# Patient Record
Sex: FEMALE | Race: WHITE | NOT HISPANIC OR LATINO | Employment: FULL TIME | URBAN - METROPOLITAN AREA
[De-identification: names, ages, dates, MRNs, and addresses within clinical notes are randomized per-mention and may not be internally consistent; named-entity substitution may affect disease eponyms.]

---

## 2017-02-23 ENCOUNTER — OFFICE VISIT (OUTPATIENT)
Dept: PODIATRY | Facility: CLINIC | Age: 42
End: 2017-02-23
Payer: COMMERCIAL

## 2017-02-23 VITALS — BODY MASS INDEX: 23.63 KG/M2 | WEIGHT: 147 LBS | HEIGHT: 66 IN

## 2017-02-23 DIAGNOSIS — M72.2 PLANTAR FASCIITIS, RIGHT: Primary | ICD-10-CM

## 2017-02-23 PROCEDURE — 99203 OFFICE O/P NEW LOW 30 MIN: CPT | Performed by: PODIATRIST

## 2017-02-23 RX ORDER — MELOXICAM 7.5 MG/1
7.5 TABLET ORAL DAILY
Qty: 30 TABLET | Refills: 1 | Status: SHIPPED | OUTPATIENT
Start: 2017-02-23 | End: 2018-01-18

## 2017-02-23 NOTE — PATIENT INSTRUCTIONS
Initial musculoskeletal treatment recommendation:    1.  Stretch the calf muscles as instructed once an hour.  2.  Ice the injured area in the evening; 20 min on/off.  3.  Take antiinflammatory medication as directed.  4.  Massage the soft tissues around the injured area in the morning to loosen the tissue  5.  Wear supportive foot wear and/or arch supports (rigid not cushion) such as Superfeet inserts which can be found at [facility] or [facility].       If no improvement in symptoms within four to six weeks, return to clinic for reevaluation.

## 2017-02-23 NOTE — MR AVS SNAPSHOT
After Visit Summary   2/23/2017    Joann Maurer    MRN: [id number]           Patient Information     Date Of Birth          1975        Visit Information        Provider Department      2/23/2017 9:45 AM Jose A Arteaga DPM [facility]        Today's Diagnoses     Plantar fasciitis, right    -  1      Care Instructions    Initial musculoskeletal treatment recommendation:    1.  Stretch the calf muscles as instructed once an hour.  2.  Ice the injured area in the evening; 20 min on/off.  3.  Take antiinflammatory medication as directed.  4.  Massage the soft tissues around the injured area in the morning to loosen the tissue  5.  Wear supportive foot wear and/or arch supports (rigid not cushion) such as Superfeet inserts which can be found at [facility] or [facility].       If no improvement in symptoms within four to six weeks, return to clinic for reevaluation.          Follow-ups after your visit        Additional Services     ORTHOTICS REFERRAL       Please be aware that coverage of these services is subject to the terms and limitations of your health insurance plan.  Call member services at your health plan with any benefit or coverage questions.      Please bring the following to your appointment:    >>   Any x-rays, CTs or MRIs which have been performed.  Contact the facility where they were done to arrange for  prior to your scheduled appointment.  Any new CT, MRI or other procedures ordered by your specialist must be performed at a [facility] facility or coordinated by your clinic's referral office.    >>   List of current medications   >>   This referral request   >>   Any documents/labs given to you for this referral    ==This Referral PRINTS in the [facility] (ORTHOTICS & PROSTHETICS) Central scheduling office ==     The [facility] Central Scheduling staff will contact patient to arrange appointments. Central Scheduling  "Phone #:  REFUGIO Velásquez  [phone]     Orthotics: Foot Orthotics                  Follow-up notes from your care team     Return in about 4 weeks (around 3/23/2017), or if symptoms worsen or fail to improve.      Who to contact     If you have questions or need follow up information about today's clinic visit or your schedule please contact [facility] directly at [phone].  Normal or non-critical lab and imaging results will be communicated to you by [employer], letter or phone within 4 business days after the clinic has received the results. If you do not hear from us within 7 days, please contact the clinic through [employer] or phone. If you have a critical or abnormal lab result, we will notify you by phone as soon as possible.  Submit refill requests through [employer] or call your pharmacy and they will forward the refill request to us. Please allow 3 business days for your refill to be completed.          Additional Information About Your Visit        [employer] Information     [employer] gives you secure access to your electronic health record. If you see a primary care provider, you can also send messages to your care team and make appointments. If you have questions, please call your primary care clinic.  If you do not have a primary care provider, please call [phone] and they will assist you.        Care EveryWhere ID     This is your Care EveryWhere ID. This could be used by other organizations to access your [facility] medical records  [id number]        Your Vitals Were     Height BMI (Body Mass Index)                1.664 m (5' 5.5\") 24.09 kg/m2           Blood Pressure from Last 3 Encounters:   06/25/15 (!) 137/99   06/04/15 116/83    Weight from Last 3 Encounters:   02/23/17 66.7 kg (147 lb)   06/04/15 64.2 kg (141 lb 9.6 oz)              We Performed the Following     ORTHOTICS REFERRAL          Today's Medication Changes          These changes are accurate as of: " 2/23/17 10:17 AM.  If you have any questions, ask your nurse or doctor.               Start taking these medicines.        Dose/Directions    meloxicam 7.5 MG tablet   Commonly known as:  MOBIC   Used for:  Plantar fasciitis, right   Started by:  Jose A Arteaga DPM        Dose:  7.5 mg   Take 1 tablet (7.5 mg) by mouth daily   Quantity:  30 tablet   Refills:  1            Where to get your medicines      These medications were sent to [id number] [address]  [address], [facility] [address]     Phone:  [phone]     meloxicam 7.5 MG tablet                Primary Care Provider Office Phone # Fax #    Azul Sargent [phone]       [facility] [address] [address]        Thank you!     Thank you for choosing [facility]  for your care. Our goal is always to provide you with excellent care. Hearing back from our patients is one way we can continue to improve our services. Please take a few minutes to complete the written survey that you may receive in the mail after your visit with us. Thank you!             Your Updated Medication List - Protect others around you: Learn how to safely use, store and throw away your medicines at www.disposemymeds.org.          This list is accurate as of: 2/23/17 10:17 AM.  Always use your most recent med list.                   Brand Name Dispense Instructions for use    meloxicam 7.5 MG tablet    MOBIC    30 tablet    Take 1 tablet (7.5 mg) by mouth daily

## 2017-02-23 NOTE — PROGRESS NOTES
"PATIENT HISTORY:  Joann Maurer is a 42 year old female who presents to clinic for a painful right foot .  The patient describes the pain as sharp stabbing.  The patient relates pain is located on the bottom of the right heel.  The patient relates the pain has been present for the past several weeks.  The patient relates pain with ambulation.  The patient has tried different shoes with little relief.      REVIEW OF SYSTEMS:  Constitutional, HEENT, cardiovascular, pulmonary, GI, , musculoskeletal, neuro, skin, endocrine and psych systems are negative, except as otherwise noted.     PAST MEDICAL HISTORY:   Past Medical History   Diagnosis Date     Exercise-induced asthma         PAST SURGICAL HISTORY:   Past Surgical History   Procedure Laterality Date     Hc ablation, endometrial, thermal, w/o hysteroscopic guidance          MEDICATIONS:   Current Outpatient Prescriptions:      meloxicam (MOBIC) 7.5 MG tablet, Take 1 tablet (7.5 mg) by mouth daily, Disp: 30 tablet, Rfl: 1     ALLERGIES:    Allergies   Allergen Reactions     Penicillins      Sulfa Drugs         SOCIAL HISTORY:   Social History     Social History     Marital status:      Spouse name: N/A     Number of children: N/A     Years of education: N/A     Occupational History     Not on file.     Social History Main Topics     Smoking status: Never Smoker     Smokeless tobacco: Never Used     Alcohol use Yes      Comment: socially     Drug use: No     Sexual activity: Yes     Partners: Male     Birth control/ protection: Surgical      Comment: vasectomy     Other Topics Concern     Not on file     Social History Narrative        FAMILY HISTORY:   Family History   Problem Relation Age of Onset     Hypertension Mother      Alcohol/Drug Father      alcohol     Alcohol/Drug Maternal Grandfather      alcohol     Breast Cancer Paternal Grandmother      late 50's     Alcohol/Drug Paternal Grandfather      alcohol        EXAM:Vitals: Ht 1.664 m (5' 5.5\")  Wt " 66.7 kg (147 lb)  BMI 24.09 kg/m2  BMI= Body mass index is 24.09 kg/(m^2).        General appearance: Patient is alert and fully cooperative with history & exam.  No sign of distress is noted during the visit.     Psychiatric: Affect is pleasant & appropriate.  Patient appears motivated to improve health.     Respiratory: Breathing is regular & unlabored while sitting.     HEENT: Hearing is intact to spoken word.  Speech is clear.  No gross evidence of visual impairment that would impact ambulation.     Dermatologic: Skin is intact to both lower extremities without significant lesions, rash or abrasion.  No paronychia or evidence of soft tissue infection is noted.     Vascular: DP & PT pulses are intact & regular bilaterally.  No significant edema or varicosities noted.  CFT and skin temperature is normal to both lower extremities.     Neurologic: Lower extremity sensation is intact to light touch.  No evidence of weakness or contracture in the lower extremities.  No evidence of neuropathy.     Musculoskeletal: Patient is ambulatory without assistive device or brace.  No gross ankle deformity noted.  No foot or ankle joint effusion is noted.  Noted pain on palpation at the attachment point of the plantar fascia on the right heel.  No surrounding erythema noted.       ASSESSMENT / PLAN:     ICD-10-CM    1. Plantar fasciitis, right M72.2 ORTHOTICS REFERRAL     meloxicam (MOBIC) 7.5 MG tablet       I have explained to Joann  about the conditions.  We discussed the nature of the condition as well as the treatment plan and expected length of recovery.  At this time, the patient was instructed on icing, stretching, tissue massage and support.  The patient was referred to [facility] for custom orthotics that will aid in offloading the tension forces to the soft tissues and prevent further inflammation.  To reduce the amount of current inflammation, the patient was prescribed Mobic 7.5 mg to be taken  daily with food and instructed to stop taking if any stomach irritation or swelling in extremities are noted.  The patient will return in four weeks for reevaluation if the symptoms do not resolve.          Disclaimer: This note consists of symbols derived from keyboarding, dictation and/or voice recognition software. As a result, there may be errors in the script that have gone undetected. Please consider this when interpreting information found in this chart.       ARY Arteaga D.P.M., F.ANUSHA.C.F.A.S.

## 2018-01-18 ENCOUNTER — OFFICE VISIT (OUTPATIENT)
Dept: OBGYN | Facility: CLINIC | Age: 43
End: 2018-01-18
Payer: COMMERCIAL

## 2018-01-18 ENCOUNTER — HOSPITAL ENCOUNTER (OUTPATIENT)
Dept: MAMMOGRAPHY | Facility: CLINIC | Age: 43
Discharge: HOME | End: 2018-01-18
Attending: OBSTETRICS & GYNECOLOGY | Admitting: OBSTETRICS & GYNECOLOGY
Payer: COMMERCIAL

## 2018-01-18 VITALS
HEIGHT: 66 IN | BODY MASS INDEX: 23.59 KG/M2 | TEMPERATURE: 98.3 F | RESPIRATION RATE: 16 BRPM | SYSTOLIC BLOOD PRESSURE: 140 MMHG | HEART RATE: 64 BPM | DIASTOLIC BLOOD PRESSURE: 99 MMHG | WEIGHT: 146.8 LBS

## 2018-01-18 DIAGNOSIS — Z12.31 VISIT FOR SCREENING MAMMOGRAM: ICD-10-CM

## 2018-01-18 DIAGNOSIS — F41.1 GENERALIZED ANXIETY DISORDER: ICD-10-CM

## 2018-01-18 DIAGNOSIS — Z01.419 ENCOUNTER FOR GYNECOLOGICAL EXAMINATION WITHOUT ABNORMAL FINDING: Primary | ICD-10-CM

## 2018-01-18 DIAGNOSIS — Z23 NEED FOR PROPHYLACTIC VACCINATION AND INOCULATION AGAINST INFLUENZA: ICD-10-CM

## 2018-01-18 PROCEDURE — 99213 OFFICE O/P EST LOW 20 MIN: CPT | Mod: 25 | Performed by: OBSTETRICS & GYNECOLOGY

## 2018-01-18 PROCEDURE — G0145 SCR C/V CYTO,THINLAYER,RESCR: HCPCS | Performed by: OBSTETRICS & GYNECOLOGY

## 2018-01-18 PROCEDURE — 77067 SCR MAMMO BI INCL CAD: CPT

## 2018-01-18 PROCEDURE — 99396 PREV VISIT EST AGE 40-64: CPT | Mod: 25 | Performed by: OBSTETRICS & GYNECOLOGY

## 2018-01-18 PROCEDURE — 87624 HPV HI-RISK TYP POOLED RSLT: CPT | Performed by: OBSTETRICS & GYNECOLOGY

## 2018-01-18 PROCEDURE — 90686 IIV4 VACC NO PRSV 0.5 ML IM: CPT | Performed by: OBSTETRICS & GYNECOLOGY

## 2018-01-18 PROCEDURE — 90471 IMMUNIZATION ADMIN: CPT | Performed by: OBSTETRICS & GYNECOLOGY

## 2018-01-18 RX ORDER — PAROXETINE 10 MG/1
10 TABLET, FILM COATED ORAL AT BEDTIME
Qty: 30 TABLET | Refills: 1 | Status: SHIPPED | OUTPATIENT
Start: 2018-01-18 | End: 2018-04-19 | Stop reason: DRUGHIGH

## 2018-01-18 ASSESSMENT — PATIENT HEALTH QUESTIONNAIRE - PHQ9
SUM OF ALL RESPONSES TO PHQ QUESTIONS 1-9: 2
5. POOR APPETITE OR OVEREATING: NEARLY EVERY DAY

## 2018-01-18 ASSESSMENT — ANXIETY QUESTIONNAIRES
6. BECOMING EASILY ANNOYED OR IRRITABLE: MORE THAN HALF THE DAYS
GAD7 TOTAL SCORE: 13
5. BEING SO RESTLESS THAT IT IS HARD TO SIT STILL: MORE THAN HALF THE DAYS
7. FEELING AFRAID AS IF SOMETHING AWFUL MIGHT HAPPEN: NOT AT ALL
1. FEELING NERVOUS, ANXIOUS, OR ON EDGE: MORE THAN HALF THE DAYS
3. WORRYING TOO MUCH ABOUT DIFFERENT THINGS: MORE THAN HALF THE DAYS
2. NOT BEING ABLE TO STOP OR CONTROL WORRYING: MORE THAN HALF THE DAYS

## 2018-01-18 NOTE — NURSING NOTE
"Chief Complaint   Patient presents with     Physical     discuss possibly getting something for anxiety       Initial BP (!) 138/105 (BP Location: Left arm, Patient Position: Chair, Cuff Size: Adult Regular)  Pulse 69  Temp 98.3  F (36.8  C) (Tympanic)  Resp 16  Ht 5' 5.5\" (1.664 m)  Wt 146 lb 12.8 oz (66.6 kg)  LMP  (LMP Unknown)  Breastfeeding? No  BMI 24.06 kg/m2 Estimated body mass index is 24.06 kg/(m^2) as calculated from the following:    Height as of this encounter: 5' 5.5\" (1.664 m).    Weight as of this encounter: 146 lb 12.8 oz (66.6 kg).  Medication Reconciliation: complete   Sera Gregg CMA      "

## 2018-01-18 NOTE — MR AVS SNAPSHOT
After Visit Summary   1/18/2018    Joann Maurer    MRN: [id number]           Patient Information     Date Of Birth          1975        Visit Information        Provider Department      1/18/2018 10:30 AM Azul Sargent MD [facility]        Today's Diagnoses     Encounter for gynecological examination without abnormal finding    -  1    Generalized anxiety disorder        Need for prophylactic vaccination and inoculation against influenza          Care Instructions      Preventive Health Recommendations  Female Ages 40 to 49    Yearly exam:     See your health care provider every year in order to  1. Review health changes.   2. Discuss preventive care.    3. Review your medicines if your doctor prescribed any.      Get a Pap test every three years (unless you have an abnormal result and your provider advises testing more often).      If you get Pap tests with HPV test, you only need to test every 5 years, unless you have an abnormal result. You do not need a Pap test if your uterus was removed (hysterectomy) and you have not had cancer.      You should be tested each year for STDs (sexually transmitted diseases), if you're at risk.       Ask your doctor if you should have a mammogram.      Have a colonoscopy (test for colon cancer) if someone in your family has had colon cancer or polyps before age 50.       Have a cholesterol test every 5 years.       Have a diabetes test (fasting glucose) after age 45. If you are at risk for diabetes, you should have this test every 3 years.    Shots: Get a flu shot each year. Get a tetanus shot every 10 years.     Nutrition:     Eat at least 5 servings of fruits and vegetables each day.    Eat whole-grain bread, whole-wheat pasta and brown rice instead of white grains and rice.    Talk to your provider about Calcium and Vitamin D.     Lifestyle    Exercise at least 150 minutes a week (an average of 30 minutes a day, 5 days a week). This will  help you control your weight and prevent disease.    Limit alcohol to one drink per day.    No smoking.     Wear sunscreen to prevent skin cancer.    See your dentist every six months for an exam and cleaning.          Follow-ups after your visit        Your next 10 appointments already scheduled     Jan 24, 2018  9:00 AM CST   School Visit with Fl School Provider   [place] [facility] [place]  ([facility] )    [address] [place] [id number] [phone]              Future tests that were ordered for you today     Open Future Orders        Priority Expected Expires Ordered    **Comprehensive metabolic panel FUTURE 1yr Routine 12/19/2018 1/18/2019 1/18/2018    **TSH with free T4 reflex FUTURE 1yr Routine 12/19/2018 1/18/2019 1/18/2018    **CBC with platelets FUTURE 1yr Routine 12/19/2018 1/18/2019 1/18/2018    Lipid panel reflex to direct LDL Fasting Routine 12/19/2018 1/18/2019 1/18/2018            Who to contact     If you have questions or need follow up information about today's clinic visit or your schedule please contact [facility] directly at [phone].  Normal or non-critical lab and imaging results will be communicated to you by [employer], letter or phone within 4 business days after the clinic has received the results. If you do not hear from us within 7 days, please contact the clinic through [employer] or phone. If you have a critical or abnormal lab result, we will notify you by phone as soon as possible.  Submit refill requests through [employer] or call your pharmacy and they will forward the refill request to us. Please allow 3 business days for your refill to be completed.          Additional Information About Your Visit        [employer] Information     [employer] gives you secure access to your electronic health record. If you see a primary care provider, you can also send messages to your care team and make appointments. If you  "have questions, please call your primary care clinic.  If you do not have a primary care provider, please call [phone] and they will assist you.        Care EveryWhere ID     This is your Care EveryWhere ID. This could be used by other organizations to access your [facility] medical records  [id number]        Your Vitals Were     Pulse Temperature Respirations Height Last Period Breastfeeding?    64 98.3  F (36.8  C) (Tympanic) 16 5' 5.5\" (1.664 m) (LMP Unknown) No    BMI (Body Mass Index)                   24.06 kg/m2            Blood Pressure from Last 3 Encounters:   01/18/18 (!) 140/99   06/25/15 (!) 137/99   06/04/15 116/83    Weight from Last 3 Encounters:   01/18/18 146 lb 12.8 oz (66.6 kg)   02/23/17 147 lb (66.7 kg)   06/04/15 141 lb 9.6 oz (64.2 kg)              We Performed the Following     FLU VAC, SPLIT VIRUS IM > 3 YO (QUADRIVALENT) [96429]     HPV High Risk Types DNA Cervical     Pap imaged thin layer screen with HPV - recommended age 30 - 65 years (select HPV order below)     Vaccine Administration, Initial [77285]          Today's Medication Changes          These changes are accurate as of: 1/18/18 12:52 PM.  If you have any questions, ask your nurse or doctor.               Start taking these medicines.        Dose/Directions    PARoxetine 10 MG tablet   Commonly known as:  PAXIL   Used for:  Generalized anxiety disorder   Started by:  Azul Sargent MD        Dose:  10 mg   Take 1 tablet (10 mg) by mouth At Bedtime   Quantity:  30 tablet   Refills:  1            Where to get your medicines      These medications were sent to [facility] - [place], [place] - [address]  [address] [place] [address] [address], [facility] [address]     Phone:  [phone]     PARoxetine 10 MG tablet                Primary Care Provider Office Phone # Fax #    Azul Sargent [phone] [address] [facility] [address]        Equal Access to Services     MAXINE BOWER " AH: Sergio Jones, wataishada luqadaha, qaybta kamariella guerlineraminrobbiedenise kasey haymeggan hernandezjennylydia chris. So [facility] [phone].    ATENCIÓN: Si habla español, tiene a phillips disposición servicios gratuitos de asistencia lingüística. Llame al [phone].    We comply with applicable federal civil rights laws and [place] laws. We do not discriminate on the basis of race, color, national origin, age, disability, sex, sexual orientation, or gender identity.            Thank you!     Thank you for choosing [facility]  for your care. Our goal is always to provide you with excellent care. Hearing back from our patients is one way we can continue to improve our services. Please take a few minutes to complete the written survey that you may receive in the mail after your visit with us. Thank you!             Your Updated Medication List - Protect others around you: Learn how to safely use, store and throw away your medicines at www.disposemymeds.org.          This list is accurate as of: 1/18/18 12:52 PM.  Always use your most recent med list.                   Brand Name Dispense Instructions for use Diagnosis    PARoxetine 10 MG tablet    PAXIL    30 tablet    Take 1 tablet (10 mg) by mouth At Bedtime    Generalized anxiety disorder

## 2018-01-18 NOTE — PROGRESS NOTES
SUBJECTIVE:   CC: Joann Maurer is an 43 year old woman who presents for preventive health visit.     Healthy Habits:    Do you get at least three servings of calcium containing foods daily (dairy, green leafy vegetables, etc.)? yes    Amount of exercise or daily activities, outside of work: 5-6 day(s) per week    Problems taking medications regularly No    Medication side effects: No    Have you had an eye exam in the past two years? no    Do you see a dentist twice per year? yes    Do you have sleep apnea, excessive snoring or daytime drowsiness?no        -------------------------------------    Today's PHQ-2 Score: PHQ-2 ( 1999 Pfizer) 1/18/2018 6/4/2015   Q1: Little interest or pleasure in doing things 0 0   Q2: Feeling down, depressed or hopeless 1 0   PHQ-2 Score 1 0         Abuse: Current or Past(Physical, Sexual or Emotional)- No  Do you feel safe in your environment - Yes  Social History   Substance Use Topics     Smoking status: Never Smoker     Smokeless tobacco: Never Used     Alcohol use Yes      Comment: socially     If you drink alcohol do you typically have >3 drinks per day or >7 drinks per week? No                     Reviewed orders with patient.  Reviewed health maintenance and updated orders accordingly - Yes  BP Readings from Last 3 Encounters:   01/18/18 (!) 140/99   06/25/15 (!) 137/99   06/04/15 116/83    Wt Readings from Last 3 Encounters:   01/18/18 146 lb 12.8 oz (66.6 kg)   02/23/17 147 lb (66.7 kg)   06/04/15 141 lb 9.6 oz (64.2 kg)                      Patient under age 50, mutual decision reflected in health maintenance.        Pertinent mammograms are reviewed under the imaging tab.  History of abnormal Pap smear:   NO - age 30-65 PAP every 5 years with negative HPV co-testing recommended  Last 3 Pap and HPV Results:   PAP / HPV Latest Ref Rng & Units 6/4/2015   PAP - NIL   HPV 16 DNA NEG Negative   HPV 18 DNA NEG Negative   OTHER HR HPV NEG Negative       Reviewed and updated as  "needed this visit by clinical staffTobacco  Allergies  Meds  Med Hx  Surg Hx  Fam Hx  Soc Hx        Reviewed and updated as needed this visit by Provider    ROS:  C: NEGATIVE for fever, chills, change in weight  I: NEGATIVE for worrisome rashes, moles or lesions  E: NEGATIVE for vision changes or irritation  ENT: NEGATIVE for ear, mouth and throat problems  R: NEGATIVE for significant cough or SOB  B: NEGATIVE for masses, tenderness or discharge  CV: NEGATIVE for chest pain, palpitations or peripheral edema  GI: NEGATIVE for nausea, abdominal pain, heartburn, or change in bowel habits  : NEGATIVE for unusual urinary or vaginal symptoms. Periods are regular.  M: NEGATIVE for significant arthralgias or myalgia  N: NEGATIVE for weakness, dizziness or paresthesias  P: NEGATIVE for changes in mood or affect    OBJECTIVE:   BP (!) 138/105 (BP Location: Left arm, Patient Position: Chair, Cuff Size: Adult Regular)  Pulse 69  Temp 98.3  F (36.8  C) (Tympanic)  Resp 16  Ht 5' 5.5\" (1.664 m)  Wt 146 lb 12.8 oz (66.6 kg)  LMP  (LMP Unknown)  Breastfeeding? No  BMI 24.06 kg/m2  EXAM:  GENERAL: healthy, alert and no distress  EYES: Eyes grossly normal to inspection  NECK: no adenopathy, no asymmetry, masses, or scars and thyroid normal to palpation  RESP: lungs clear to auscultation - no rales, rhonchi or wheezes  BREAST: normal without masses, tenderness or nipple discharge and no palpable axillary masses or adenopathy  CV: regular rate and rhythm, normal S1 S2, no S3 or S4, no murmur, click or rub, no peripheral edema and peripheral pulses strong  ABDOMEN: soft, nontender, no hepatosplenomegaly, no masses and bowel sounds normal   (female): normal female external genitalia, normal urethral meatus, vaginal mucosa pink, moist, well rugated, and normal cervix/adnexa/uterus without masses or discharge  MS: no gross musculoskeletal defects noted, no edema  SKIN: no suspicious lesions or rashes  NEURO: Normal " strength and tone, mentation intact and speech normal  PSYCH: mentation appears normal, affect normal/bright    ASSESSMENT/PLAN:   1. Encounter for gynecological examination without abnormal finding  Routine health maintenance reviewed.  Encouraged patient to check her blood pressures outside of the office (she has access at work).  If remains elevated, then we will need to consider initiating medication.    - **Comprehensive metabolic panel FUTURE 1yr; Future  - **TSH with free T4 reflex FUTURE 1yr; Future  - **CBC with platelets FUTURE 1yr; Future  - Lipid panel reflex to direct LDL Fasting; Future  - Pap imaged thin layer screen with HPV - recommended age 30 - 65 years (select HPV order below)  - HPV High Risk Types DNA Cervical    2. Generalized anxiety disorder  Discussed options for talk therapy versus trial of medication.  Patient amenable to trial of medication.  Reviewed medication pros/cons and when to consider increasing dosage.     - PARoxetine (PAXIL) 10 MG tablet; Take 1 tablet (10 mg) by mouth At Bedtime  Dispense: 30 tablet; Refill: 1    3. Need for prophylactic vaccination and inoculation against influenza  - FLU VAC, SPLIT VIRUS IM > 3 YO (QUADRIVALENT) [01644]  - Vaccine Administration, Initial [56516]    COUNSELING:   Reviewed preventive health counseling, as reflected in patient instructions  Special attention given to:        Regular exercise       Healthy diet/nutrition       Vision screening       Immunizations    Vaccinated for: Influenza           Alcohol Use       Folic Acid Counseling       Osteoporosis Prevention/Bone Health       Safe sex practices/STD prevention       (Ruma)menopause management            BP Screening:   Last 3 BP Readings:    BP Readings from Last 3 Encounters:   01/18/18 (!) 140/99   06/25/15 (!) 137/99   06/04/15 116/83       The following was recommended to the patient:  Re-screen within 4 weeks and recommend lifestyle modifications   reports that she has never  "smoked. She has never used smokeless tobacco.    Estimated body mass index is 24.06 kg/(m^2) as calculated from the following:    Height as of this encounter: 5' 5.5\" (1.664 m).    Weight as of this encounter: 146 lb 12.8 oz (66.6 kg).         Counseling Resources:  ATP IV Guidelines  Pooled Cohorts Equation Calculator  Breast Cancer Risk Calculator  FRAX Risk Assessment  ICSI Preventive Guidelines  Dietary Guidelines for Americans, 2010  USDA's MyPlate  ASA Prophylaxis  Lung CA Screening    Azul Sargent MD  [facility] Influenza Immunization Documentation    1.  Is the person to be vaccinated sick today?   No    2. Does the person to be vaccinated have an allergy to a component   of the vaccine?   No  Egg Allergy Algorithm Link    3. Has the person to be vaccinated ever had a serious reaction   to influenza vaccine in the past?   No    4. Has the person to be vaccinated ever had Guillain-Barré syndrome?   No    Form completed by Sera Gregg CMA           "

## 2018-01-19 ASSESSMENT — ANXIETY QUESTIONNAIRES: GAD7 TOTAL SCORE: 13

## 2018-01-23 LAB
COPATH REPORT: NORMAL
PAP: NORMAL

## 2018-01-24 ENCOUNTER — OFFICE VISIT (OUTPATIENT)
Dept: LAB | Facility: SCHOOL | Age: 43
End: 2018-01-24
Payer: COMMERCIAL

## 2018-01-24 VITALS
SYSTOLIC BLOOD PRESSURE: 132 MMHG | WEIGHT: 146 LBS | OXYGEN SATURATION: 96 % | HEIGHT: 66 IN | BODY MASS INDEX: 23.46 KG/M2 | DIASTOLIC BLOOD PRESSURE: 78 MMHG | HEART RATE: 94 BPM | TEMPERATURE: 98.4 F | RESPIRATION RATE: 20 BRPM

## 2018-01-24 DIAGNOSIS — Z00.00 ENCOUNTER FOR WELLNESS EXAMINATION IN ADULT: Primary | ICD-10-CM

## 2018-01-24 LAB — GLUCOSE SERPL-MCNC: 80 MG/DL (ref 70–99)

## 2018-01-24 PROCEDURE — 82947 ASSAY GLUCOSE BLOOD QUANT: CPT | Performed by: NURSE PRACTITIONER

## 2018-01-24 PROCEDURE — 36415 COLL VENOUS BLD VENIPUNCTURE: CPT | Performed by: NURSE PRACTITIONER

## 2018-01-24 PROCEDURE — 80061 LIPID PANEL: CPT | Performed by: NURSE PRACTITIONER

## 2018-01-24 PROCEDURE — 99203 OFFICE O/P NEW LOW 30 MIN: CPT | Performed by: NURSE PRACTITIONER

## 2018-01-24 NOTE — PROGRESS NOTES
"  SUBJECTIVE:  CC: Joann Maurer is an 43 year old woman who presents for Wellness Check at [facility] [id number].     Review of Healthy Lifestyle:    Do you get at least three servings of calcium containing foods daily (dairy, green leafy vegetables, etc.)? yes     Do you have a high-fiber diet? yes     Amount of exercise or daily activities, outside of work: 3-4 day(s) per week    Do you wear sunscreen on a regular basis? No     Are you taking your medications regularly Yes     Have you had an eye exam in the past two years? no    Do you see a dentist twice per year? yes    Do you have sleep apnea, excessive snoring or excessive daytime drowsiness? no    Do you use tobacco in any form? no       OBJECTIVE:    Vitals: /78 (BP Location: Right arm, Patient Position: Sitting, Cuff Size: Adult Regular)  Pulse 94  Temp 98.4  F (36.9  C) (Tympanic)  Resp 20  Ht 5' 5.5\" (1.664 m)  Wt 146 lb (66.2 kg)  LMP  (LMP Unknown)  SpO2 96%  Breastfeeding? No  BMI 23.93 kg/m2  BMI= Body mass index is 23.93 kg/(m^2).     HEARING:   Right Ear:      1000 Hz RESPONSE- on Level:   20 db  (Conditioning sound)   1000 Hz: RESPONSE- on Level:   20 db    2000 Hz: RESPONSE- on Level:   20 db    4000 Hz: RESPONSE- on Level:   20 db     Left Ear:      4000 Hz: RESPONSE- on Level:   20 db    2000 Hz: RESPONSE- on Level:   20 db    1000 Hz: RESPONSE- on Level:   20 db     500 Hz: RESPONSE- on Level:   20 db     Right Ear:    500 Hz: RESPONSE- on Level:   20 db     Hearing Acuity: Pass    Hearing Assessment: normal  VISION:  Right eye:  20/20  Left eye:     20/20  Both eyes: 20/20  Corrective lenses?  No    Medication Reconciliation: complete    ASSESSMENT/PLAN:    (Z00.00) Encounter for wellness examination in adult  (primary encounter diagnosis)  Comment:    Plan: Glucose whole blood, Lipid Profile           Discussed preventative counseling - which is UTD.        COUNSELING:      reports that she has never " "smoked. She has never used smokeless tobacco.    Estimated body mass index is 23.93 kg/(m^2) as calculated from the following:    Height as of this encounter: 5' 5.5\" (1.664 m).    Weight as of this encounter: 146 lb (66.2 kg).       Counseling Resources  [employer]'s MyPlate  [email or web address]/    [place] SCHO[employer] PROVIDER  [facility]     "

## 2018-01-24 NOTE — NURSING NOTE
"Initial /78 (BP Location: Right arm, Patient Position: Sitting, Cuff Size: Adult Regular)  Pulse 94  Temp 98.4  F (36.9  C) (Tympanic)  Resp 20  Ht 5' 5.5\" (1.664 m)  Wt 146 lb (66.2 kg)  LMP  (LMP Unknown)  SpO2 96%  Breastfeeding? No  BMI 23.93 kg/m2 Estimated body mass index is 23.93 kg/(m^2) as calculated from the following:    Height as of this encounter: 5' 5.5\" (1.664 m).    Weight as of this encounter: 146 lb (66.2 kg). .    Alysa Darnell CMA ([facility])  "

## 2018-01-24 NOTE — PATIENT INSTRUCTIONS
"Wellness Visit Recommendations:      See your regular primary care health provider every year in order to help stay healthy.    Review health changes.     Review your medicines if your doctor has prescribed any.    Talk to your provider about how often to have your cholesterol checked.    If you are at risk for diabetes, you should have a diabetes test (fasting glucose).    Shots: Get a flu shot each year. Get a tetanus shot every 10 years.     Review with your primary care provider other immunizations that you may need based on your age and/or medical/surgical history    Nutrition:     Eat at least 5 servings of fruits and vegetables each day.    Eat whole-grain bread, whole-wheat pasta and brown rice instead of white grains and rice.    Preventive Care to be reviewed by your primary care provider:    Females:        Cervical Cancer Screening                          Breast Cancer Screening                          Colon Cancer Screening  Males:             Prostrate Cancer Screening                          Colon Cancer Screening      Lifestyle:    Exercise at least 150 minutes a week (30 minutes a day, 5 days of the week). This will help you control your weight and help prevent disease or manage disease.    Limit alcohol to one drink per day or less depending on your past medical history.    No smoking.     Wear sunscreen to prevent skin cancer.    See your dentist every six months for an exam and cleaning.    Today's Vital Signs:  /78 (BP Location: Right arm, Patient Position: Sitting, Cuff Size: Adult Regular)  Pulse 94  Temp 98.4  F (36.9  C) (Tympanic)  Resp 20  Ht 5' 5.5\" (1.664 m)  Wt 146 lb (66.2 kg)  LMP  (LMP Unknown)  SpO2 96%  Breastfeeding? No  BMI 23.93 kg/m2                                                                                                                    Joann Maurer has completed a Wellness Check at the [address] on " 1/24/2018.      ____________________________________________  [facility]

## 2018-01-24 NOTE — MR AVS SNAPSHOT
After Visit Summary   1/24/2018    Joann Maurer    MRN: [id number]           Patient Information     Date Of Birth          1975        Visit Information        Provider Department      1/24/2018 9:00 AM Gianna Wells NP [facility]         Today's Diagnoses     Encounter for wellness examination in adult    -  1      Care Instructions    Wellness Visit Recommendations:      See your regular primary care health provider every year in order to help stay healthy.    Review health changes.     Review your medicines if your doctor has prescribed any.    Talk to your provider about how often to have your cholesterol checked.    If you are at risk for diabetes, you should have a diabetes test (fasting glucose).    Shots: Get a flu shot each year. Get a tetanus shot every 10 years.     Review with your primary care provider other immunizations that you may need based on your age and/or medical/surgical history    Nutrition:     Eat at least 5 servings of fruits and vegetables each day.    Eat whole-grain bread, whole-wheat pasta and brown rice instead of white grains and rice.    Preventive Care to be reviewed by your primary care provider:    Females:        Cervical Cancer Screening                          Breast Cancer Screening                          Colon Cancer Screening  Males:             Prostrate Cancer Screening                          Colon Cancer Screening      Lifestyle:    Exercise at least 150 minutes a week (30 minutes a day, 5 days of the week). This will help you control your weight and help prevent disease or manage disease.    Limit alcohol to one drink per day or less depending on your past medical history.    No smoking.     Wear sunscreen to prevent skin cancer.    See your dentist every six months for an exam and cleaning.    Today's Vital Signs:  /78 (BP Location: Right arm, Patient Position: Sitting, Cuff Size: Adult Regular)  Pulse  "94  Temp 98.4  F (36.9  C) (Tympanic)  Resp 20  Ht 5' 5.5\" (1.664 m)  Wt 146 lb (66.2 kg)  LMP  (LMP Unknown)  SpO2 96%  Breastfeeding? No  BMI 23.93 kg/m2                                                                                                                    Joann Maurer has completed a Wellness Check at the [place] [address] [facility] on 1/24/2018.      ____________________________________________  [employer]                                                                                         Follow-ups after your visit        Who to contact     If you have questions or need follow up information about today's clinic visit or your schedule please contact [place] [address] directly at [phone].  Normal or non-critical lab and imaging results will be communicated to you by MyChart, letter or phone within 4 business days after the clinic has received the results. If you do not hear from us within 7 days, please contact the clinic through Vysrhart or phone. If you have a critical or abnormal lab result, we will notify you by phone as soon as possible.  Submit refill requests through [employer] or call your pharmacy and they will forward the refill request to us. Please allow 3 business days for your refill to be completed.          Additional Information About Your Visit        MyChart Information     [employer] gives you secure access to your electronic health record. If you see a primary care provider, you can also send messages to your care team and make appointments. If you have questions, please call your primary care clinic.  If you do not have a primary care provider, please call [phone] and they will assist you.        Care EveryWhere ID     This is your Care EveryWhere ID. This could be used by other organizations to access your [place] medical records  [id number]        Your Vitals Were     Pulse Temperature Respirations Height Last Period Pulse " "Oximetry    94 98.4  F (36.9  C) (Tympanic) 20 5' 5.5\" (1.664 m) (LMP Unknown) 96%    Breastfeeding? BMI (Body Mass Index)                No 23.93 kg/m2           Blood Pressure from Last 3 Encounters:   01/24/18 132/78   01/18/18 (!) 140/99   06/25/15 (!) 137/99    Weight from Last 3 Encounters:   01/24/18 146 lb (66.2 kg)   01/18/18 146 lb 12.8 oz (66.6 kg)   02/23/17 147 lb (66.7 kg)              We Performed the Following     Glucose whole blood     Lipid Profile        Primary Care Provider Office Phone # Fax #    Azul Sargent [phone] [address] [facility] [address]        Equal Access to Services     MAXINE BOWER : Hadii aad ku hadasho Sojan, waaxda luqadaha, qaybta kaalmada adeceliyarukhsana, evonne grullon . So [facility] [phone].    ATENCIÓN: Si habla español, tiene a phillips disposición servicios gratuitos de asistencia lingüística. Jj al [phone].    We comply with applicable federal civil rights laws and [place] laws. We do not discriminate on the basis of race, color, national origin, age, disability, sex, sexual orientation, or gender identity.            Thank you!     Thank you for choosing [place] [address]  for your care. Our goal is always to provide you with excellent care. Hearing back from our patients is one way we can continue to improve our services. Please take a few minutes to complete the written survey that you may receive in the mail after your visit with us. Thank you!             Your Updated Medication List - Protect others around you: Learn how to safely use, store and throw away your medicines at www.disposemymeds.org.          This list is accurate as of 1/24/18  9:19 AM.  Always use your most recent med list.                   Brand Name Dispense Instructions for use Diagnosis    PARoxetine 10 MG tablet    PAXIL    30 tablet    Take 1 tablet (10 mg) by mouth At Bedtime    Generalized anxiety disorder "

## 2018-01-25 LAB
CHOLEST SERPL-MCNC: 223 MG/DL
FINAL DIAGNOSIS: NORMAL
HDLC SERPL-MCNC: 143 MG/DL
HPV HR 12 DNA CVX QL NAA+PROBE: NEGATIVE
HPV16 DNA SPEC QL NAA+PROBE: NEGATIVE
HPV18 DNA SPEC QL NAA+PROBE: NEGATIVE
LDLC SERPL CALC-MCNC: 70 MG/DL
NONHDLC SERPL-MCNC: 80 MG/DL
SPECIMEN DESCRIPTION: NORMAL
SPECIMEN SOURCE CVX/VAG CYTO: NORMAL
TRIGL SERPL-MCNC: 51 MG/DL

## 2018-04-19 DIAGNOSIS — F41.1 GENERALIZED ANXIETY DISORDER: ICD-10-CM

## 2018-04-19 RX ORDER — PAROXETINE 20 MG/1
20 TABLET, FILM COATED ORAL AT BEDTIME
Qty: 30 TABLET | Refills: 6 | Status: CANCELLED | OUTPATIENT
Start: 2018-04-19

## 2018-04-19 NOTE — TELEPHONE ENCOUNTER
Patient's LOV 1/18/18.       2. Generalized anxiety disorder  Discussed options for talk therapy versus trial of medication.  Patient amenable to trial of medication.  Reviewed medication pros/cons and when to consider increasing dosage.       -Patient increased dose to 20mg 2/6/18.     Sent Genomind message to patient for update on increased dose. If ineffective, will recommend she come in to office for medication re-evaluation, if effective will refill as requested.     Jayden ISAACS   [facility] RN

## 2018-11-30 ENCOUNTER — OFFICE VISIT (OUTPATIENT)
Dept: LAB | Facility: SCHOOL | Age: 43
End: 2018-11-30
Payer: COMMERCIAL

## 2018-11-30 VITALS
DIASTOLIC BLOOD PRESSURE: 98 MMHG | OXYGEN SATURATION: 99 % | WEIGHT: 149.2 LBS | SYSTOLIC BLOOD PRESSURE: 130 MMHG | BODY MASS INDEX: 23.98 KG/M2 | HEIGHT: 66 IN | TEMPERATURE: 98.2 F | HEART RATE: 76 BPM | RESPIRATION RATE: 12 BRPM

## 2018-11-30 DIAGNOSIS — Z00.00 WELLNESS EXAMINATION: Primary | ICD-10-CM

## 2018-11-30 DIAGNOSIS — R03.0 ELEVATED BLOOD PRESSURE READING WITHOUT DIAGNOSIS OF HYPERTENSION: ICD-10-CM

## 2018-11-30 DIAGNOSIS — Z23 NEED FOR PROPHYLACTIC VACCINATION AND INOCULATION AGAINST INFLUENZA: ICD-10-CM

## 2018-11-30 PROCEDURE — 90471 IMMUNIZATION ADMIN: CPT | Performed by: NURSE PRACTITIONER

## 2018-11-30 PROCEDURE — 99213 OFFICE O/P EST LOW 20 MIN: CPT | Mod: 25 | Performed by: NURSE PRACTITIONER

## 2018-11-30 PROCEDURE — 90686 IIV4 VACC NO PRSV 0.5 ML IM: CPT | Performed by: NURSE PRACTITIONER

## 2018-11-30 NOTE — MR AVS SNAPSHOT
After Visit Summary   11/30/2018    Joann Maurer    MRN: [id number]           Patient Information     Date Of Birth          1975        Visit Information        Provider Department      11/30/2018 8:30 AM Provider, [facility]        Care Instructions                    Joann Maurer has completed a Wellness Check at the [facility] on 11/30/2018.      ____________________________________________  [facility] PROVIDER                                                                               Wellness Visit Recommendations:      See your regular primary care health provider every year in order to help stay healthy.    Review health changes.     Review your medicines if your doctor has prescribed any.    Talk to your provider about how often to have your cholesterol checked.    If you are at risk for diabetes, you should have a diabetes test (fasting glucose).    Shots: Get a flu shot each year. Get a tetanus shot every 10 years.     Review with your primary care provider other immunizations that you may need based on your age and/or medical/surgical history    Nutrition:     Eat at least 5 servings of fruits and vegetables each day.    Eat whole-grain bread, whole-wheat pasta and brown rice instead of white grains and rice.    Preventive Care to be reviewed by your primary care provider:    Females:        Cervical Cancer Screening                          Breast Cancer Screening                          Colon Cancer Screening  Males:             Prostrate Cancer Screening                          Colon Cancer Screening      Lifestyle:    Exercise at least 150 minutes a week (30 minutes a day, 5 days of the week). This will help you control your weight and help prevent disease or manage disease.    Limit alcohol to one drink per day or less depending on your past medical history.    No smoking.     Wear sunscreen to prevent skin  "cancer.    See your dentist every six months for an exam and cleaning.    Today's Vital Signs:  BP (!) 130/98  Pulse 76  Temp 98.2  F (36.8  C) (Tympanic)  Resp 12  Ht 5' 5.95\" (1.675 m)  Wt 149 lb 3.2 oz (67.7 kg)  SpO2 99%  BMI 24.12 kg/m2          Follow-ups after your visit        Who to contact     If you have questions or need follow up information about today's clinic visit or your schedule please contact [facility] directly at [phone].  Normal or non-critical lab and imaging results will be communicated to you by BooRahhart, letter or phone within 4 business days after the clinic has received the results. If you do not hear from us within 7 days, please contact the clinic through [employer] or phone. If you have a critical or abnormal lab result, we will notify you by phone as soon as possible.  Submit refill requests through [employer] or call your pharmacy and they will forward the refill request to us. Please allow 3 business days for your refill to be completed.          Additional Information About Your Visit        BooRahhart Information     [employer] gives you secure access to your electronic health record. If you see a primary care provider, you can also send messages to your care team and make appointments. If you have questions, please call your primary care clinic.  If you do not have a primary care provider, please call [phone] and they will assist you.        Care EveryWhere ID     This is your Care EveryWhere ID. This could be used by other organizations to access your [place] medical records  [id number]        Your Vitals Were     Pulse Temperature Respirations Height Pulse Oximetry BMI (Body Mass Index)    76 98.2  F (36.8  C) (Tympanic) 12 5' 5.95\" (1.675 m) 99% 24.12 kg/m2       Blood Pressure from Last 3 Encounters:   11/30/18 (!) 130/98   01/24/18 132/78   01/18/18 (!) 140/99    Weight from Last 3 Encounters:   11/30/18 149 lb 3.2 oz (67.7 kg)   01/24/18 " 146 lb (66.2 kg)   01/18/18 146 lb 12.8 oz (66.6 kg)              Today, you had the following     No orders found for display       Primary Care Provider Office Phone # Fax #    Azul Sargent [phone] [address] [facility] [address]        Equal Access to Services     [facility] CampusISA[facility] : Hadii aad ku hadasho Soomaali, waaxda luqadaha, qaybta kaalmada adeegyada, waxay idiin hayaan adeeg kharash laJonasaan . So [facility] [phone].    ATENCIÓN: Si habla español, tiene a phillips disposición servicios gratuitos de asistencia lingüística. Llame al [phone].    We comply with applicable federal civil rights laws and [place] laws. We do not discriminate on the basis of race, color, national origin, age, disability, sex, sexual orientation, or gender identity.            Thank you!     Thank you for choosing [place] [address]  for your care. Our goal is always to provide you with excellent care. Hearing back from our patients is one way we can continue to improve our services. Please take a few minutes to complete the written survey that you may receive in the mail after your visit with us. Thank you!             Your Updated Medication List - Protect others around you: Learn how to safely use, store and throw away your medicines at www.disposemymeds.org.          This list is accurate as of 11/30/18  8:43 AM.  Always use your most recent med list.                   Brand Name Dispense Instructions for use Diagnosis    PARoxetine 30 MG tablet    PAXIL    90 tablet    Take 1 tablet (30 mg) by mouth At Bedtime    Anxiety

## 2018-11-30 NOTE — PROGRESS NOTES
"  SUBJECTIVE:  CC: Joann Maurer is an 43 year old woman who presents for Wellness Check at [facility].     Review of Healthy Lifestyle:    Do you get at least three servings of calcium containing foods daily (dairy, green leafy vegetables, etc.)? yes     Do you have a high-fiber diet? yes     Amount of exercise or daily activities, outside of work: 5 day(s) per week for 60 min    Do you wear sunscreen on a regular basis? Yes      Are you taking your medications regularly Yes     Have you had an eye exam in the past two years? yes    Do you see a dentist twice per year? yes    Do you have sleep apnea, excessive snoring or excessive daytime drowsiness? no    Do you use tobacco in any form? no       OBJECTIVE:    Vitals: BP (!) 130/98  Pulse 76  Temp 98.2  F (36.8  C) (Tympanic)  Resp 12  Ht 5' 5.95\" (1.675 m)  Wt 149 lb 3.2 oz (67.7 kg)  SpO2 99%  BMI 24.12 kg/m2  BMI= Body mass index is 24.12 kg/(m^2).    HEARING: Right Ear:        500Hz:  RESPONSE- on Level:   20 db    1000 Hz: RESPONSE- on Level:   20 db    2000 Hz: RESPONSE- on Level:   20 db    4000 Hz: RESPONSE- on Level:   20 db     Left Ear:       500Hz:  RESPONSE- on Level:   20 db    1000 Hz: RESPONSE- on Level: 25 db   2000 Hz: RESPONSE- on Level:   20 db    4000 Hz: RESPONSE- on Level:   20 db     VISION:  Right eye:  20/25  Left eye:     20/20  Corrective lenses?  No    Medication Reconciliation: complete    ASSESSMENT/PLAN: Counseled on elevated blood pressure. States she drinks a lot of diet soda and salts all of her food. Trial lifestyle changes and if BP still elevated interested in PGEN study. Given information.    COUNSELING:      reports that she has never smoked. She has never used smokeless tobacco.    Estimated body mass index is 24.12 kg/(m^2) as calculated from the following:    Height as of this encounter: 5' 5.95\" (1.675 m).    Weight as of this encounter: 149 lb 3.2 oz (67.7 kg).       Counseling " Resources  [employer]  [email or web address]    GILBERT Shepherd CNP    [place] SCHOOL PROVIDER  [facility]       Injectable Influenza Immunization Documentation    1.  Is the person to be vaccinated sick today?   No    2. Does the person to be vaccinated have an allergy to a component   of the vaccine?   No  Egg Allergy Algorithm Link    3. Has the person to be vaccinated ever had a serious reaction   to influenza vaccine in the past?   No    4. Has the person to be vaccinated ever had Guillain-Barré syndrome?   No    Form completed by Pia Kim [facility]

## 2018-11-30 NOTE — PATIENT INSTRUCTIONS
"                Joann Maurer has completed a Wellness Check at the [facility] on 11/30/2018.      ____________________________________________  FL SCHOOL PROVIDER                                                                               Wellness Visit Recommendations:      See your regular primary care health provider every year in order to help stay healthy.    Review health changes.     Review your medicines if your doctor has prescribed any.    Talk to your provider about how often to have your cholesterol checked.    If you are at risk for diabetes, you should have a diabetes test (fasting glucose).    Shots: Get a flu shot each year. Get a tetanus shot every 10 years.     Review with your primary care provider other immunizations that you may need based on your age and/or medical/surgical history    Nutrition:     Eat at least 5 servings of fruits and vegetables each day.    Eat whole-grain bread, whole-wheat pasta and brown rice instead of white grains and rice.    Preventive Care to be reviewed by your primary care provider:    Females:        Cervical Cancer Screening                          Breast Cancer Screening                          Colon Cancer Screening  Males:             Prostrate Cancer Screening                          Colon Cancer Screening      Lifestyle:    Exercise at least 150 minutes a week (30 minutes a day, 5 days of the week). This will help you control your weight and help prevent disease or manage disease.    Limit alcohol to one drink per day or less depending on your past medical history.    No smoking.     Wear sunscreen to prevent skin cancer.    See your dentist every six months for an exam and cleaning.    Today's Vital Signs:  BP (!) 130/98  Pulse 76  Temp 98.2  F (36.8  C) (Tympanic)  Resp 12  Ht 5' 5.95\" (1.675 m)  Wt 149 lb 3.2 oz (67.7 kg)  SpO2 99%  BMI 24.12 kg/m2  "

## 2019-04-12 ENCOUNTER — NURSE TRIAGE (OUTPATIENT)
Dept: NURSING | Facility: CLINIC | Age: 44
End: 2019-04-12

## 2019-04-12 NOTE — TELEPHONE ENCOUNTER
Reason for call; Pt called. On 11/30/19 - my  and I  had a physical at [place] and requesting another copy of physical with signed by provider.     Recommendation / teaching ; Caller changed her mind and no answer to backline for [place] and didn't want to call medical records , she want to call HR instead .      Caller Verbalizes understanding and denies further questions and will call back if further symptoms to triage or questions  .  Margo Galvan RN  - [place] Nurse Advisor

## 2019-04-22 ENCOUNTER — HOSPITAL ENCOUNTER (OUTPATIENT)
Dept: MAMMOGRAPHY | Facility: CLINIC | Age: 44
Discharge: HOME | End: 2019-04-22
Attending: OBSTETRICS & GYNECOLOGY | Admitting: OBSTETRICS & GYNECOLOGY
Payer: COMMERCIAL

## 2019-04-22 DIAGNOSIS — Z12.31 VISIT FOR SCREENING MAMMOGRAM: ICD-10-CM

## 2019-04-22 PROCEDURE — 77067 SCR MAMMO BI INCL CAD: CPT

## 2019-10-01 ENCOUNTER — HEALTH MAINTENANCE LETTER (OUTPATIENT)
Age: 44
End: 2019-10-01

## 2019-10-23 ENCOUNTER — OFFICE VISIT (OUTPATIENT)
Dept: LAB | Facility: SCHOOL | Age: 44
End: 2019-10-23
Payer: COMMERCIAL

## 2019-10-23 VITALS
DIASTOLIC BLOOD PRESSURE: 62 MMHG | OXYGEN SATURATION: 97 % | HEIGHT: 70 IN | TEMPERATURE: 98.3 F | SYSTOLIC BLOOD PRESSURE: 130 MMHG | HEART RATE: 74 BPM | WEIGHT: 148 LBS | BODY MASS INDEX: 21.19 KG/M2

## 2019-10-23 DIAGNOSIS — Z23 NEED FOR VACCINATION: ICD-10-CM

## 2019-10-23 DIAGNOSIS — Z00.00 WELLNESS EXAMINATION: Primary | ICD-10-CM

## 2019-10-23 DIAGNOSIS — Z23 NEED FOR PROPHYLACTIC VACCINATION AND INOCULATION AGAINST INFLUENZA: ICD-10-CM

## 2019-10-23 PROCEDURE — 99213 OFFICE O/P EST LOW 20 MIN: CPT | Mod: 25

## 2019-10-23 PROCEDURE — 90472 IMMUNIZATION ADMIN EACH ADD: CPT

## 2019-10-23 PROCEDURE — 90471 IMMUNIZATION ADMIN: CPT

## 2019-10-23 PROCEDURE — 90686 IIV4 VACC NO PRSV 0.5 ML IM: CPT

## 2019-10-23 PROCEDURE — 90715 TDAP VACCINE 7 YRS/> IM: CPT

## 2019-10-23 ASSESSMENT — MIFFLIN-ST. JEOR: SCORE: 1383.63
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
## 2019-10-23 NOTE — PATIENT INSTRUCTIONS
"                Joann Maurer has completed a Wellness Check at the [facility] on 10/23/2019.      ____________________________________________  FL SCHOOL PROVIDER                                                                               Wellness Visit Recommendations:      See your regular primary care health provider every year in order to help stay healthy.    Review health changes.     Review your medicines if your doctor has prescribed any.    Talk to your provider about how often to have your cholesterol checked.    If you are at risk for diabetes, you should have a diabetes test (fasting glucose).    Shots: Get a flu shot each year. Get a tetanus shot every 10 years.     Review with your primary care provider other immunizations that you may need based on your age and/or medical/surgical history    Nutrition:     Eat at least 5 servings of fruits and vegetables each day.    Eat whole-grain bread, whole-wheat pasta and brown rice instead of white grains and rice.    Preventive Care to be reviewed by your primary care provider:    Females:        Cervical Cancer Screening                          Breast Cancer Screening                          Colon Cancer Screening  Males:             Prostrate Cancer Screening                          Colon Cancer Screening      Lifestyle:    Exercise at least 150 minutes a week (30 minutes a day, 5 days of the week). This will help you control your weight and help prevent disease or manage disease.    Limit alcohol to one drink per day or less depending on your past medical history.    No smoking.     Wear sunscreen to prevent skin cancer.    See your dentist every six months for an exam and cleaning.    Today's Vital Signs:  /62 (BP Location: Right arm, Patient Position: Sitting, Cuff Size: Adult Regular)   Pulse 74   Temp 98.3  F (36.8  C) (Tympanic)   Ht 1.765 m (5' 9.5\")   Wt 67.1 kg (148 lb)   LMP 09/15/2019 (Approximate)   SpO2 " 97%   Breastfeeding? No   BMI 21.54 kg/m

## 2019-10-30 ENCOUNTER — HEALTH MAINTENANCE LETTER (OUTPATIENT)
Age: 44
End: 2019-10-30

## 2019-12-20 ENCOUNTER — OFFICE VISIT (OUTPATIENT)
Dept: LAB | Facility: SCHOOL | Age: 44
End: 2019-12-20
Payer: COMMERCIAL

## 2019-12-20 VITALS
WEIGHT: 145 LBS | OXYGEN SATURATION: 97 % | HEIGHT: 66 IN | TEMPERATURE: 98.5 F | RESPIRATION RATE: 16 BRPM | BODY MASS INDEX: 23.3 KG/M2 | HEART RATE: 82 BPM | SYSTOLIC BLOOD PRESSURE: 124 MMHG | DIASTOLIC BLOOD PRESSURE: 84 MMHG

## 2019-12-20 DIAGNOSIS — J02.9 SORE THROAT: Primary | ICD-10-CM

## 2019-12-20 DIAGNOSIS — J20.9 ACUTE BRONCHITIS WITH SYMPTOMS > 10 DAYS: ICD-10-CM

## 2019-12-20 LAB — S PYO AG THROAT QL IA.RAPID: NORMAL

## 2019-12-20 PROCEDURE — 87880 STREP A ASSAY W/OPTIC: CPT

## 2019-12-20 PROCEDURE — 99213 OFFICE O/P EST LOW 20 MIN: CPT | Performed by: NURSE PRACTITIONER

## 2019-12-20 RX ORDER — AZITHROMYCIN 250 MG/1
TABLET, FILM COATED ORAL
Qty: 6 TABLET | Refills: 0 | Status: SHIPPED | OUTPATIENT
Start: 2019-12-20 | End: 2019-12-25

## 2019-12-20 ASSESSMENT — PAIN SCALES - GENERAL: PAINLEVEL: MILD PAIN (2)

## 2019-12-20 ASSESSMENT — MIFFLIN-ST. JEOR: SCORE: 1314.47

## 2019-12-20 NOTE — PATIENT INSTRUCTIONS
Start the azithromycin today.    Can use Mucinex, robitussin for cough.    If no improvement in one week, return for recheck.

## 2019-12-20 NOTE — PROGRESS NOTES
Subjective     Joann Maurer is a 44 year old female who presents to clinic today for the following health issues:    HPI   ENT Symptoms             Symptoms: cc Present Absent Comment   Fever/Chills   x Last Friday   Fatigue  x     Muscle Aches   x    Eye Irritation   x    Sneezing  x     Nasal Tank/Drg  x     Sinus Pressure/Pain  x     Loss of smell   x    Dental pain   x    Sore Throat  x     Swollen Glands  x     Ear Pain/Fullness   x    Cough  x     Wheeze  x     Chest Pain   x    Shortness of breath  x     Rash   x    Other   x      Symptom duration:  x 2 weeks   Symptom severity:  moderate   Treatments tried:  none   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       Patient Active Problem List   Diagnosis     Depression     Past Surgical History:   Procedure Laterality Date     HC ABLATION, ENDOMETRIAL, THERMAL, W/O HYSTEROSCOPIC GUIDANCE         Social History     Tobacco Use     Smoking status: Never Smoker     Smokeless tobacco: Never Used   Substance Use Topics     Alcohol use: Yes     Comment: socially     Family History   Problem Relation Age of Onset     Hypertension Mother      Alcohol/Drug Father         alcohol     Alcohol/Drug Maternal Grandfather         alcohol     Breast Cancer Paternal Grandmother         late 50's     Alcohol/Drug Paternal Grandfather         alcohol         Current Outpatient Medications   Medication Sig Dispense Refill     azithromycin (ZITHROMAX) 250 MG tablet Take 2 tablets (500 mg) by mouth daily for 1 day, THEN 1 tablet (250 mg) daily for 4 days. 6 tablet 0         Reviewed and updated as needed this visit by Provider  Tobacco  Allergies  Meds  Problems  Med Hx  Surg Hx  Fam Hx         Review of Systems   ROS COMP: Constitutional, HEENT, cardiovascular, pulmonary, gi and gu systems are negative, except as otherwise noted.      Objective    /84 (BP Location: Right arm, Patient Position: Sitting, Cuff Size: Adult Regular)   Pulse 82   Temp 98.5  F (36.9  C)  "(Tympanic)   Resp 16   Ht 1.676 m (5' 6\")   Wt 65.8 kg (145 lb)   SpO2 97%   BMI 23.40 kg/m    Body mass index is 23.4 kg/m .  Physical Exam  Vitals signs and nursing note reviewed.   Constitutional:       Appearance: Normal appearance.   HENT:      Head: Normocephalic and atraumatic.      Right Ear: Tympanic membrane and ear canal normal.      Left Ear: Tympanic membrane and ear canal normal.      Nose: Nose normal. No rhinorrhea.      Right Sinus: No maxillary sinus tenderness or frontal sinus tenderness.      Left Sinus: No maxillary sinus tenderness or frontal sinus tenderness.      Mouth/Throat:      Lips: Pink.      Mouth: Mucous membranes are moist.      Pharynx: Oropharynx is clear. Posterior oropharyngeal erythema present.   Eyes:      General: Lids are normal.      Conjunctiva/sclera: Conjunctivae normal.      Comments: Non icteric   Neck:      Musculoskeletal: Neck supple.   Cardiovascular:      Rate and Rhythm: Normal rate and regular rhythm.      Pulses: Normal pulses.      Heart sounds: Normal heart sounds, S1 normal and S2 normal.   Pulmonary:      Effort: Pulmonary effort is normal.      Breath sounds: Normal breath sounds and air entry.   Lymphadenopathy:      Cervical: No cervical adenopathy.   Skin:     General: Skin is warm and dry.   Neurological:      General: No focal deficit present.      Mental Status: She is alert and oriented to person, place, and time.   Psychiatric:         Mood and Affect: Mood normal.         Behavior: Behavior normal.         Thought Content: Thought content normal.         Judgment: Judgment normal.          Diagnostic Test Results:  Labs reviewed in Epic  Strep screen - Negative        Assessment & Plan     1. Sore throat    - Rapid strep screen  - Beta strep group A culture    2. Acute bronchitis with symptoms > 10 days    - azithromycin (ZITHROMAX) 250 MG tablet; Take 2 tablets (500 mg) by mouth daily for 1 day, THEN 1 tablet (250 mg) daily for 4 days.  " Dispense: 6 tablet; Refill: 0  - Beta strep group A culture           See Patient Instructions    Return in about 1 week (around 12/27/2019) for worsening or continued symptoms.    [facility] PROVIDER  [facility]

## 2019-12-21 LAB
BACTERIA SPEC CULT: NORMAL
SPECIMEN SOURCE: NORMAL

## 2019-12-23 ENCOUNTER — TELEPHONE (OUTPATIENT)
Dept: FAMILY MEDICINE | Facility: CLINIC | Age: 44
End: 2019-12-23

## 2019-12-23 DIAGNOSIS — J02.9 SORE THROAT: Primary | ICD-10-CM

## 2019-12-23 NOTE — TELEPHONE ENCOUNTER
Shanice BAH asked me to call this patient,   Left message on answering machine for patient to call back.    María Elena saw her in [place] ([facility]) on Friday 12/20/2019 . The throat culture was not able to be done because it was sent in on the wrong swab.       Specimen Description Throat    Culture Micro Canceled, Test credited   Improperly collected (no preservative)   SAMPLE WAS COLLECTED IN THE WRONG COLLECTION KIT. CX [id number]      How is she feeling? The rapid strep was negative.     If she is much better and doesn't want to come in to have another swab for throat culture, that is ok,   If she is not feeling well and wants it done again, it will be a no charge, throat swab, to be done and sent to lab (per Azul Toussaint. )     I will be happy to talk to her if she calls, (if it is today and I am available) otherwise, any questions please speak with Shanice patel, hardeep,     (OF NOTE- patient was treated with azithromycin, per María's notes)     Jayla Higgins RNC

## 2019-12-24 ENCOUNTER — ALLIED HEALTH/NURSE VISIT (OUTPATIENT)
Dept: FAMILY MEDICINE | Facility: CLINIC | Age: 44
End: 2019-12-24
Payer: COMMERCIAL

## 2019-12-24 DIAGNOSIS — J02.9 SORE THROAT: ICD-10-CM

## 2019-12-24 PROCEDURE — 87070 CULTURE OTHR SPECIMN AEROBIC: CPT | Performed by: NURSE PRACTITIONER

## 2019-12-24 PROCEDURE — 99207 ZZC NO CHARGE NURSE ONLY: CPT

## 2019-12-24 NOTE — TELEPHONE ENCOUNTER
Patient would like to come in today for throat culture swab because her symptoms are not much better.  Order placed, patient put on CMA schedule in [place].    Ethel Castro RN

## 2019-12-26 LAB
BACTERIA SPEC CULT: NORMAL
Lab: NORMAL
SPECIMEN SOURCE: NORMAL

## 2020-03-30 ENCOUNTER — NURSE TRIAGE (OUTPATIENT)
Dept: NURSING | Facility: CLINIC | Age: 45
End: 2020-03-30

## 2020-03-30 NOTE — TELEPHONE ENCOUNTER
"    Additional Information    SEVERE sore throat pain    Negative: Difficulty breathing (per caller) but not severe    Negative: Fever > 103 F (39.4 C)    Negative: Refuses to drink anything for > 12 hours    Negative: Throat culture results, call about    Negative: Productive cough is the main symptom    Negative: Runny nose is the main symptom    Negative: Drooling or spitting out saliva (because can't swallow)    Negative: Unable to open mouth completely    Negative: Drinking very little and has signs of dehydration (e.g., no urine > 12 hours, very dry mouth, very lightheaded)    Negative: Patient sounds very sick or weak to the triager    Negative: Severe difficulty breathing (e.g., struggling for each breath, speaks in single words)    Negative: Sounds like a life-threatening emergency to the triager    Answer Assessment - Initial Assessment Questions  1. ONSET: \"When did the throat start hurting?\" (Hours or days ago)       X 24 hours  2. SEVERITY: \"How bad is the sore throat?\" (Scale 1-10; mild, moderate or severe)    - MILD (1-3):  doesn't interfere with eating or normal activities    - MODERATE (4-7): interferes with eating some solids and normal activities    - SEVERE (8-10):  excruciating pain, interferes with most normal activities    - SEVERE DYSPHAGIA: can't swallow liquids, drooling      severe  3. STREP EXPOSURE: \"Has there been any exposure to strep within the past week?\" If so, ask: \"What type of contact occurred?\"       no  4.  VIRAL SYMPTOMS: \"Are there any symptoms of a cold, such as a runny nose, cough, hoarse voice or red eyes?\"       Yes - runny nose  5. FEVER: \"Do you have a fever?\" If so, ask: \"What is your temperature, how was it measured, and when did it start?\"      no  6. PUS ON THE TONSILS: \"Is there pus on the tonsils in the back of your throat?\"      no  7. OTHER SYMPTOMS: \"Do you have any other symptoms?\" (e.g., difficulty breathing, headache, rash)      no  8. PREGNANCY: \"Is there " "any chance you are pregnant?\" \"When was your last menstrual period?\"      no    Protocols used: SORE THROAT-A-OH    Referred to [email or web address]  "

## 2020-10-26 ENCOUNTER — OFFICE VISIT (OUTPATIENT)
Dept: LAB | Facility: SCHOOL | Age: 45
End: 2020-10-26
Payer: COMMERCIAL

## 2020-10-26 VITALS
BODY MASS INDEX: 24.59 KG/M2 | HEART RATE: 73 BPM | SYSTOLIC BLOOD PRESSURE: 120 MMHG | TEMPERATURE: 99.1 F | HEIGHT: 66 IN | WEIGHT: 153 LBS | RESPIRATION RATE: 18 BRPM | OXYGEN SATURATION: 97 % | DIASTOLIC BLOOD PRESSURE: 80 MMHG

## 2020-10-26 DIAGNOSIS — Z23 NEED FOR INFLUENZA VACCINATION: ICD-10-CM

## 2020-10-26 DIAGNOSIS — Z00.00 WELLNESS EXAMINATION: Primary | ICD-10-CM

## 2020-10-26 PROCEDURE — 99213 OFFICE O/P EST LOW 20 MIN: CPT | Mod: 25

## 2020-10-26 PROCEDURE — 90471 IMMUNIZATION ADMIN: CPT

## 2020-10-26 PROCEDURE — 90686 IIV4 VACC NO PRSV 0.5 ML IM: CPT

## 2020-10-26 ASSESSMENT — MIFFLIN-ST. JEOR: SCORE: 1337.81

## 2020-10-26 NOTE — PROGRESS NOTES
"  SUBJECTIVE:  CC: Joann Maurer is an 45 year old woman who presents for Wellness Check at [facility].     Review of Healthy Lifestyle:    Do you get at least three servings of calcium containing foods daily (dairy, green leafy vegetables, etc.)? yes     Do you have a high-fiber diet? yes     Amount of exercise or daily activities, outside of work: 3 day(s) per week    Do you wear sunscreen on a regular basis? Yes 30-50     Are you taking your medications regularly not applicable    Have you had an eye exam in the past two years? no    Do you see a dentist twice per year? yes    Do you have sleep apnea, excessive snoring or excessive daytime drowsiness? no    Do you use tobacco in any form? no       OBJECTIVE:    Vitals: There were no vitals taken for this visit.  BMI= There is no height or weight on file to calculate BMI.    HEARING: Right Ear:        500Hz:  RESPONSE- on Level:  20 db    1000 Hz: RESPONSE- on Level: 40 db   2000 Hz: RESPONSE- on Level: 25 db   4000 Hz: RESPONSE- on Level: 40 db    Left Ear:       500Hz:  RESPONSE- on Level:  25db   1000 Hz: RESPONSE- on Level: tone not heard   2000 Hz: RESPONSE- on Level: 40 db   4000 Hz: RESPONSE- on Level: 40 db    VISION:  Right eye:  20/16  Left eye:     20/16  Both eyes: 20/16  Corrective lenses?  No    Medication Reconciliation: complete    ASSESSMENT/PLAN:  (Z00.00) Wellness examination  (primary encounter diagnosis)  Comment: doing well overall. Will return for lipid panel. Does not notice decreased hearing.  Plan:     (Z23) Need for influenza vaccination  Comment:   Plan: Vaccine Administration, Initial [99166], HC FLU        VAC PRESRV FREE QUAD SPLIT VIR > 6 MONTHS IM              COUNSELING:      reports that she has never smoked. She has never used smokeless tobacco.    Estimated body mass index is 23.4 kg/m  as calculated from the following:    Height as of 12/20/19: 1.676 m (5' 6\").    Weight as of 12/20/19: 65.8 " kg (145 lb).       Counseling Resources  [employer]  [email or web address]  María Parker CNP  [place] SCHOOL PROVIDER  [facility]

## 2020-10-26 NOTE — PATIENT INSTRUCTIONS
"                Joann Maurer has completed a Wellness Check at the [facility] on 10/26/2020.      ____________________________________________  [place] SCHOOL PROVIDER                                                                               Wellness Visit Recommendations:      See your regular primary care health provider every year in order to help stay healthy.    Review health changes.     Review your medicines if your doctor has prescribed any.    Talk to your provider about how often to have your cholesterol checked.    If you are at risk for diabetes, you should have a diabetes test (fasting glucose).    Shots: Get a flu shot each year. Get a tetanus shot every 10 years.     Review with your primary care provider other immunizations that you may need based on your age and/or medical/surgical history    Nutrition:     Eat at least 5 servings of fruits and vegetables each day.    Eat whole-grain bread, whole-wheat pasta and brown rice instead of white grains and rice.    Preventive Care to be reviewed by your primary care provider:    Females:        Cervical Cancer Screening                          Breast Cancer Screening                          Colon Cancer Screening  Males:             Prostrate Cancer Screening                          Colon Cancer Screening      Lifestyle:    Exercise at least 150 minutes a week (30 minutes a day, 5 days of the week). This will help you control your weight and help prevent disease or manage disease.    Limit alcohol to one drink per day or less depending on your past medical history.    No smoking.     Wear sunscreen to prevent skin cancer.    See your dentist every six months for an exam and cleaning.    Today's Vital Signs:  /80 (BP Location: Right arm, Patient Position: Sitting, Cuff Size: Adult Large)   Pulse 73   Temp 99.1  F (37.3  C) (Tympanic)   Resp 18   Ht 1.664 m (5' 5.5\")   Wt 69.4 kg (153 lb)   LMP 10/12/2020 " (Approximate)   SpO2 97%   Breastfeeding No   BMI 25.07 kg/m

## 2021-01-15 ENCOUNTER — HEALTH MAINTENANCE LETTER (OUTPATIENT)
Age: 46
End: 2021-01-15

## 2021-01-23 ENCOUNTER — HEALTH MAINTENANCE LETTER (OUTPATIENT)
Age: 46
End: 2021-01-23

## 2021-08-25 ENCOUNTER — HOSPITAL ENCOUNTER (OUTPATIENT)
Dept: MAMMOGRAPHY | Facility: CLINIC | Age: 46
Discharge: HOME | End: 2021-08-25
Attending: OBSTETRICS & GYNECOLOGY | Admitting: OBSTETRICS & GYNECOLOGY
Payer: COMMERCIAL

## 2021-08-25 DIAGNOSIS — Z12.31 VISIT FOR SCREENING MAMMOGRAM: ICD-10-CM

## 2021-08-25 PROCEDURE — 77063 BREAST TOMOSYNTHESIS BI: CPT

## 2021-09-04 ENCOUNTER — HEALTH MAINTENANCE LETTER (OUTPATIENT)
Age: 46
End: 2021-09-04

## 2022-02-19 ENCOUNTER — HEALTH MAINTENANCE LETTER (OUTPATIENT)
Age: 47
End: 2022-02-19

## 2022-06-08 ENCOUNTER — OFFICE VISIT (OUTPATIENT)
Dept: LAB | Facility: SCHOOL | Age: 47
End: 2022-06-08
Payer: COMMERCIAL

## 2022-06-08 VITALS
DIASTOLIC BLOOD PRESSURE: 86 MMHG | HEIGHT: 66 IN | TEMPERATURE: 98.4 F | RESPIRATION RATE: 18 BRPM | SYSTOLIC BLOOD PRESSURE: 126 MMHG | HEART RATE: 75 BPM | WEIGHT: 170 LBS | BODY MASS INDEX: 27.32 KG/M2 | OXYGEN SATURATION: 98 %

## 2022-06-08 DIAGNOSIS — Z00.00 WELLNESS EXAMINATION: ICD-10-CM

## 2022-06-08 DIAGNOSIS — Z12.11 SPECIAL SCREENING FOR MALIGNANT NEOPLASMS, COLON: ICD-10-CM

## 2022-06-08 DIAGNOSIS — Z00.8 ENCOUNTER FOR BIOMETRIC SCREENING: Primary | ICD-10-CM

## 2022-06-08 DIAGNOSIS — R41.840 INATTENTION: ICD-10-CM

## 2022-06-08 LAB
CHOLEST SERPL-MCNC: 193 MG/DL
FASTING STATUS PATIENT QL REPORTED: YES
GLUCOSE BLD-MCNC: 87 MG/DL (ref 60–99)
HDLC SERPL-MCNC: 108 MG/DL
LDLC SERPL CALC-MCNC: 71 MG/DL
NONHDLC SERPL-MCNC: 85 MG/DL
TRIGL SERPL-MCNC: 68 MG/DL

## 2022-06-08 PROCEDURE — 82947 ASSAY GLUCOSE BLOOD QUANT: CPT

## 2022-06-08 PROCEDURE — 36415 COLL VENOUS BLD VENIPUNCTURE: CPT

## 2022-06-08 PROCEDURE — 80061 LIPID PANEL: CPT

## 2022-06-08 PROCEDURE — 99213 OFFICE O/P EST LOW 20 MIN: CPT

## 2022-06-08 ASSESSMENT — PAIN SCALES - GENERAL: PAINLEVEL: NO PAIN (0)

## 2022-06-08 NOTE — PROGRESS NOTES
"  SUBJECTIVE:  CC: Joann is a 47 year old who presents for Wellness Check at [facility].     Review of Healthy Lifestyle:    Do you get at least three servings of calcium containing foods daily (dairy, green leafy vegetables, etc.)? yes     Do you have a high-fiber diet? yes     Amount of exercise or daily activities, outside of work: 4 day(s) per week    Do you wear sunscreen on a regular basis? Yes      Are you taking your medications regularly not applicable    Have you had an eye exam in the past two years? yes    Do you see a dentist twice per year? yes    Do you have sleep apnea, excessive snoring or excessive daytime drowsiness? no    Do you use tobacco in any form? no       OBJECTIVE:    Vitals: /86 (BP Location: Right arm, Patient Position: Sitting, Cuff Size: Adult Regular)   Pulse 75   Temp 98.4  F (36.9  C) (Tympanic)   Resp 18   Ht 1.664 m (5' 5.5\")   Wt 77.1 kg (170 lb)   SpO2 98%   BMI 27.86 kg/m    BMI= Body mass index is 27.86 kg/m .    HEARING: :  Testing not done:  Abi isaacs    VISION: Goes to [facility] and sees Dr. Guardado was just seen this year.      Medication Reconciliation: complete    ASSESSMENT/PLAN:  (Z00.8) Encounter for biometric screening  (primary encounter diagnosis)  Comment: labs sent  Plan: Glucose whole blood, Lipid Profile            (Z00.00) Wellness examination  Comment: doing well  Plan:     (Z12.11) Special screening for malignant neoplasms, colon  Comment: Due for colon cancer screening  Plan: Adult Gastro Ref - Procedure Only            (R43.163) Inattention  Comment: Would like evaluation for ADHD, feels like she has extreme difficulty with attention, focus. Referral placed for testing, advised will need to establish with PCP for treatment once testing is complete.  Plan: Adult Mental Health  Referral              COUNSELING:      reports that she has never smoked. She has never used smokeless " "tobacco.    Estimated body mass index is 27.86 kg/m  as calculated from the following:    Height as of this encounter: 1.664 m (5' 5.5\").    Weight as of this encounter: 77.1 kg (170 lb).   Weight management plan: Discussed healthy diet and exercise guidelines    Counseling Resources  [employer] MyPlate  [email or web address]  María Parker, CELESTINO  [place] SCHOOL PROVIDER  [facility]     "

## 2022-06-08 NOTE — PATIENT INSTRUCTIONS
"                Joann Maurer has completed a Wellness Check at the [facility] on 6/8/2022.      ____________________________________________  [place] [employer] PROVID[employer]                                                                               Wellness Visit Recommendations:    See your regular primary care health provider every year in order to help stay healthy.  Review health changes.   Review your medicines if your doctor has prescribed any.  Talk to your provider about how often to have your cholesterol checked.  If you are at risk for diabetes, you should have a diabetes test (fasting glucose).  Shots: Get a flu shot each year. Get a tetanus shot every 10 years.   Review with your primary care provider other immunizations that you may need based on your age and/or medical/surgical history    Nutrition:   Eat at least 5 servings of fruits and vegetables each day.  Eat whole-grain bread, whole-wheat pasta and brown rice instead of white grains and rice.    Preventive Care to be reviewed by your primary care provider:    Females:        Cervical Cancer Screening                          Breast Cancer Screening                          Colon Cancer Screening  Males:             Prostrate Cancer Screening                          Colon Cancer Screening      Lifestyle:  Exercise at least 150 minutes a week (30 minutes a day, 5 days of the week). This will help you control your weight and help prevent disease or manage disease.  Limit alcohol to one drink per day or less depending on your past medical history.  No smoking.   Wear sunscreen to prevent skin cancer.  See your dentist every six months for an exam and cleaning.    Today's Vital Signs:  /86 (BP Location: Right arm, Patient Position: Sitting, Cuff Size: Adult Regular)   Pulse 75   Temp 98.4  F (36.9  C) (Tympanic)   Resp 18   Ht 1.664 m (5' 5.5\")   SpO2 98%   BMI 25.07 kg/m    "

## 2022-08-26 ENCOUNTER — HOSPITAL ENCOUNTER (OUTPATIENT)
Dept: MAMMOGRAPHY | Facility: CLINIC | Age: 47
Discharge: HOME | End: 2022-08-26
Attending: OBSTETRICS & GYNECOLOGY | Admitting: OBSTETRICS & GYNECOLOGY
Payer: COMMERCIAL

## 2022-08-26 DIAGNOSIS — Z12.31 VISIT FOR SCREENING MAMMOGRAM: ICD-10-CM

## 2022-08-26 PROCEDURE — 77067 SCR MAMMO BI INCL CAD: CPT

## 2022-10-16 ENCOUNTER — HEALTH MAINTENANCE LETTER (OUTPATIENT)
Age: 47
End: 2022-10-16

## 2023-04-01 ENCOUNTER — HEALTH MAINTENANCE LETTER (OUTPATIENT)
Age: 48
End: 2023-04-01

## 2023-07-10 ENCOUNTER — VIRTUAL VISIT (OUTPATIENT)
Dept: PSYCHOLOGY | Facility: CLINIC | Age: 48
End: 2023-07-10
Payer: COMMERCIAL

## 2023-07-10 DIAGNOSIS — F88 DEVELOPMENTAL MENTAL DISORDER: Primary | ICD-10-CM

## 2023-07-10 PROCEDURE — 90832 PSYTX W PT 30 MINUTES: CPT | Mod: VID | Performed by: PSYCHOLOGIST

## 2023-07-10 ASSESSMENT — ANXIETY QUESTIONNAIRES
6. BECOMING EASILY ANNOYED OR IRRITABLE: SEVERAL DAYS
GAD7 TOTAL SCORE: 6
1. FEELING NERVOUS, ANXIOUS, OR ON EDGE: SEVERAL DAYS
2. NOT BEING ABLE TO STOP OR CONTROL WORRYING: NOT AT ALL
GAD7 TOTAL SCORE: 6
7. FEELING AFRAID AS IF SOMETHING AWFUL MIGHT HAPPEN: NOT AT ALL
3. WORRYING TOO MUCH ABOUT DIFFERENT THINGS: SEVERAL DAYS
5. BEING SO RESTLESS THAT IT IS HARD TO SIT STILL: NEARLY EVERY DAY

## 2023-07-10 ASSESSMENT — COLUMBIA-SUICIDE SEVERITY RATING SCALE - C-SSRS
6. HAVE YOU EVER DONE ANYTHING, STARTED TO DO ANYTHING, OR PREPARED TO DO ANYTHING TO END YOUR LIFE?: NO
TOTAL  NUMBER OF ABORTED OR SELF INTERRUPTED ATTEMPTS LIFETIME: NO
1. HAVE YOU WISHED YOU WERE DEAD OR WISHED YOU COULD GO TO SLEEP AND NOT WAKE UP?: NO
TOTAL  NUMBER OF INTERRUPTED ATTEMPTS LIFETIME: NO
ATTEMPT LIFETIME: NO
2. HAVE YOU ACTUALLY HAD ANY THOUGHTS OF KILLING YOURSELF?: NO

## 2023-07-10 ASSESSMENT — PATIENT HEALTH QUESTIONNAIRE - PHQ9
5. POOR APPETITE OR OVEREATING: NOT AT ALL
SUM OF ALL RESPONSES TO PHQ QUESTIONS 1-9: 5
10. IF YOU CHECKED OFF ANY PROBLEMS, HOW DIFFICULT HAVE THESE PROBLEMS MADE IT FOR YOU TO DO YOUR WORK, TAKE CARE OF THINGS AT HOME, OR GET ALONG WITH OTHER PEOPLE: SOMEWHAT DIFFICULT
SUM OF ALL RESPONSES TO PHQ QUESTIONS 1-9: 5

## 2023-07-10 NOTE — PROGRESS NOTES
[facility]   Mental Health & Addiction Services     Progress Note - Initial Visit    Client Name:  Joann Maurer Date: July 10, 2023         Service Type: Individual     Visit Start Time: 11:00am  Visit End Time: 11:30am    Visit #: 1    Attendees: Client attended alone    Service Modality:  Video Visit:      Provider verified identity through the following two step process.  Patient provided:  Patient  and Patient address    Telemedicine Visit: The patient's condition can be safely assessed and treated via synchronous audio and visual telemedicine encounter.      Reason for Telemedicine Visit: Services only offered telehealth    Originating Site (Patient Location): Patient's place of employment    Distant Site (Provider Location): Provider Remote Setting- Home Office    Consent:  The patient/guardian has verbally consented to: the potential risks and benefits of telemedicine (video visit) versus in person care; bill my insurance or make self-payment for services provided; and responsibility for payment of non-covered services.     Patient would like the video invitation sent by:  Send to e-mail at: [email or web address]    Mode of Communication:  Video Conference via [employer]well    Distant Location (Provider):  Off-site    As the provider I attest to compliance with applicable laws and regulations related to telemedicine.       DATA:  Extended Session (53+ minutes): No  Interactive Complexity: No   Crisis: No     Presenting Concerns/Current Stressors:   Patient presented to session to initiate the ADHD evaluation process.         2018    11:44 AM 7/10/2023    10:34 AM   PHQ   PHQ-9 Total Score 2 5   Q9: Thoughts of better off dead/self-harm past 2 weeks Not at all Not at all            2018    11:44 AM 7/10/2023    10:34 AM   JONNY-7 SCORE   Total Score 13 6       ASSESSMENT:  Mental Status Assessment:  Appearance:   Appropriate   Eye Contact:   Good   Psychomotor  Behavior: Normal   Attitude:   Cooperative   Orientation:   All  Speech   Rate / Production: Normal/ Responsive   Volume:  Normal   Mood:    Normal  Affect:    Appropriate   Thought Content:  Clear   Thought Form:  Logical   Insight:    Good       Safety Issues and Plan for Safety and Risk Management:     Weber Suicide Severity Rating Scale (Lifetime/Recent)      7/10/2023    11:06 AM   Weber Suicide Severity Rating (Lifetime/Recent)   1. Wish to be Dead (Lifetime) N   2. Non-Specific Active Suicidal Thoughts (Lifetime) N   Actual Attempt (Lifetime) N   Has subject engaged in non-suicidal self-injurious behavior? (Lifetime) N   Interrupted Attempts (Lifetime) N   Aborted or Self-Interrupted Attempt (Lifetime) N   Preparatory Acts or Behavior (Lifetime) N   Calculated C-SSRS Risk Score (Lifetime/Recent) No Risk Indicated     Patient denies current fears or concerns for personal safety.  Patient denies current or recent suicidal ideation or behaviors.  Patient denies current or recent homicidal ideation or behaviors.  Patient denies current or recent self injurious behavior or ideation.  Patient denies other safety concerns.  Recommended that patient call 911 or go to the local ED should there be a change in any of these risk factors.   Patient reports there are no firearms in the house.    Diagnostic Criteria:  F88:  A. A persistent pattern of inattention and/or hyperactivity-impulsivity that interferes with functioning or development, as characterized by (1) and/or (2):   1. Six or more inattention symptoms that have persisted for at least 6 months to a degree that is inconsistent with developmental level and that negatively impacts directly on social and academic/occupational activities.   2. Six or more hyperactivity and impulsivity symptoms that have persisted for at least 6 months to a degree that is inconsistent with developmental level and that negatively impacts directly on social and  academic/occupational activities.  B. Several symptoms (inattentive or hyperactive/impulsive) were present before the age of 12 years.  C. Several symptoms (inattentive or hyperactive/impulsive) present in ?2 settings (eg, at home, school, or work; with friends or relatives; in other activities).  D. There is clear evidence that the symptoms interfere with or reduce the quality of social, academic, or occupational functioning.  E. Symptoms do not occur exclusively during the course of schizophrenia or another psychotic disorder, and are not better explained by another mental disorder (eg, mood disorder, anxiety disorder, dissociative disorder, personality disorder, substance intoxication, or withdrawal).      DSM5 Diagnoses: (Sustained by DSM5 Criteria Listed Above)  Diagnoses:   1. Developmental mental disorder      Psychosocial & Contextual Factors: social support, employed, 2 sons have been diagnosed with ADHD    PROMIS-10 Scores  Global Mental Health Score: (P) 10  Global Physical Health Score: (P) 17   PROMIS TOTAL - SUBSCORES: (P) 27      Intervention:              Reviewed symptoms and history of presenting concern. Patient endorsed symptoms consistent with ADHD. History of depression postpartum in 2012; in remission.  Patient denied symptoms associated with anxiety , gideon, panic, OCD, trauma and perceptual difficulties. Unable to complete diagnostic intake, will be completed in next session.   CBT: socratic questioning, positive reinforcement  EFT: empathetic attunement, emotion checking, emotion naming  MI: open ended questions, affirmations, reflections        Attendance Agreement:  Client has not signed the attendance agreement. Discussed expectations at beginning of this first session and patient agreed.       PLAN:  Provider will continue Diagnostic Assessment in next session. Patient will complete Bipin questionnaires  and CNS Vital Signs prior to next session (7/17/2023).    Patient meets the  following risk assessment and triage: Patient denied any current/recent/lifetime history of suicidal ideation and/or behaviors.  No safety plan indicated at this time.     Medical necessity criteria is warranted in order to: Measure a psychological disorder and its severity and functional impairment to determine psychiatric diagnosis when a mental illness is suspected, or to achieve a differential diagnosis from a range of medical/psychological disorders that present with similar constellations of symptoms (e.g., determination and measurement of anxiety severity and impact in the presence of ongoing asthma or heart disease), Perform symptom measurement to objectively measure treatment effectiveness and/or determine the need to refer for pharmacological treatment or other medical evaluation (e.g., based on severity and chronicity of symptoms) and Evaluate primary symptoms of impaired attention and concentration that can occur in many neurological and psychiatric conditions.    Medical necessity for psychological assessment is warranted as a result of the following: (1) A specific clinical question is posed that relates to the condition/symptoms being addressed (2) The question cannot be adequately addressed by clinical interview and/or behavioral observation (3) Results of psychological testing are reasonably expected to provide an answer to the query (4) It is reasonably expected that the testing will provide information leading to a clearer diagnosis and/or guide treatment planning with an expectation of improved clinical outcome.    I acknowledge that, based upon current clinical information, the patient and I have reviewed and discussed issues pertaining to the purpose of therapy/testing, potential therapeutic goals, procedures, risks and benefits, and estimated duration of therapy/testing. Issues pertaining to fees/insurance and confidentiality were also addressed with the patient, who indicated understanding  and elected to continue with appointments. I will not be providing any experimental procedures and, if we agree that a change in clinical procedure would be more beneficial, I will obtain specific consent for that procedure or refer you to another provider who has expertise in that area.       Iona Palmer PsyD,   Clinical Psychologist

## 2023-07-17 ENCOUNTER — VIRTUAL VISIT (OUTPATIENT)
Dept: PSYCHOLOGY | Facility: CLINIC | Age: 48
End: 2023-07-17
Payer: COMMERCIAL

## 2023-07-17 DIAGNOSIS — F88 DEVELOPMENTAL MENTAL DISORDER: Primary | ICD-10-CM

## 2023-07-17 PROCEDURE — 90791 PSYCH DIAGNOSTIC EVALUATION: CPT | Mod: 95 | Performed by: PSYCHOLOGIST

## 2023-07-17 ASSESSMENT — PATIENT HEALTH QUESTIONNAIRE - PHQ9
SUM OF ALL RESPONSES TO PHQ QUESTIONS 1-9: 5
10. IF YOU CHECKED OFF ANY PROBLEMS, HOW DIFFICULT HAVE THESE PROBLEMS MADE IT FOR YOU TO DO YOUR WORK, TAKE CARE OF THINGS AT HOME, OR GET ALONG WITH OTHER PEOPLE: NOT DIFFICULT AT ALL
SUM OF ALL RESPONSES TO PHQ QUESTIONS 1-9: 5
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
## 2023-08-02 ENCOUNTER — OFFICE VISIT (OUTPATIENT)
Dept: FAMILY MEDICINE | Facility: CLINIC | Age: 48
End: 2023-08-02
Payer: COMMERCIAL

## 2023-08-02 VITALS
WEIGHT: 156.4 LBS | BODY MASS INDEX: 25.13 KG/M2 | DIASTOLIC BLOOD PRESSURE: 98 MMHG | HEART RATE: 82 BPM | HEIGHT: 66 IN | OXYGEN SATURATION: 99 % | RESPIRATION RATE: 16 BRPM | SYSTOLIC BLOOD PRESSURE: 156 MMHG | TEMPERATURE: 97.3 F

## 2023-08-02 DIAGNOSIS — Z12.11 SCREEN FOR COLON CANCER: ICD-10-CM

## 2023-08-02 DIAGNOSIS — Z12.31 VISIT FOR SCREENING MAMMOGRAM: ICD-10-CM

## 2023-08-02 DIAGNOSIS — M85.672 BONE CYST OF LEFT ANKLE: Primary | ICD-10-CM

## 2023-08-02 PROCEDURE — 99213 OFFICE O/P EST LOW 20 MIN: CPT | Performed by: FAMILY MEDICINE

## 2023-08-02 ASSESSMENT — PAIN SCALES - GENERAL: PAINLEVEL: NO PAIN (0)

## 2023-08-02 NOTE — PROGRESS NOTES
"  Assessment & Plan     (M85.672) Bone cyst of left ankle  (primary encounter diagnosis)  Comment: appears like a cyst- recommend seeing podiatrist if interested in removal   Plan: patient declined a referral today. She will like to watch this .    (Z12.11) Screen for colon cancer  Comment: recommend colon cancer screening  Plan: patient will schedule this    (Z12.31) Visit for screening mammogram  Comment: Patient reminded of mammogram  Plan: MA SCREENING DIGITAL BILAT - Future  (s+30)            Blood pressure is quite elevated. Patient states that she thinks it is \"white coat hypertension. Recommend recheck in two weeks with an RN.       BMI:   Estimated body mass index is 25.63 kg/m  as calculated from the following:    Height as of this encounter: 1.664 m (5' 5.5\").    Weight as of this encounter: 70.9 kg (156 lb 6.4 oz).     FUTURE APPOINTMENTS:       - Follow-up visit as needed.    Christian Hernandez MD  [facility]    Sage David is a 48 year old, presenting for the following health issues:    Patient is a 48 yr old female here for a lump on the left ankle. She says it has been there for years. It has not caused any discomfort but she thinks it may be increasing in size. She reports no recent injury to her foot.   She was reminded of her pap smear,colonoscopy and mammogram.  Mass (Lump on Ankle/Foot) and Health Maintenance (Reminded due for pappe/pe, deu for mammo end of month , due for colonoscopy - orders pended)        8/2/2023     8:25 AM   Additional Questions   Roomed by Adan BOWMAN CMA   Accompanied by self       History of Present Illness       Reason for visit:  Foot  Symptom onset:  3-4 weeks ago    She eats 2-3 servings of fruits and vegetables daily.She consumes 1 sweetened beverage(s) daily.She exercises with enough effort to increase her heart rate 30 to 60 minutes per day.  She exercises with enough effort to increase her heart rate 5 days per week.   She is " "taking medications regularly.       Concern - Lump   Onset: x 3-4 weeks   Description: Lump on top of  Left Foot   Intensity: mild  Progression of Symptoms:  same  Accompanying Signs & Symptoms: swelling   Previous history of similar problem: none   Precipitating factors:        Worsened by: exercise   Alleviating factors:        Improved by: none   Therapies tried and outcome: ice - no relief        Review of Systems   Constitutional, HEENT, cardiovascular, pulmonary, gi and gu systems are negative, except as otherwise noted.      Objective    BP (!) 156/102 (BP Location: Right arm, Patient Position: Sitting, Cuff Size: Adult Regular)   Pulse 82   Temp 97.3  F (36.3  C) (Tympanic)   Resp 16   Ht 1.664 m (5' 5.5\")   Wt 70.9 kg (156 lb 6.4 oz)   LMP  (LMP Unknown)   SpO2 99%   BMI 25.63 kg/m    Body mass index is 25.63 kg/m .  Physical Exam  Constitutional:       Appearance: Normal appearance.   HENT:      Head: Normocephalic and atraumatic.   Eyes:      Pupils: Pupils are equal, round, and reactive to light.   Cardiovascular:      Rate and Rhythm: Normal rate and regular rhythm.   Musculoskeletal:         General: Swelling present. No tenderness. Normal range of motion.      Cervical back: Normal range of motion and neck supple.   Skin:     General: Skin is warm and dry.   Neurological:      Mental Status: She is alert and oriented to person, place, and time.   Psychiatric:         Mood and Affect: Mood normal.         Behavior: Behavior normal.                      "

## 2023-08-21 ENCOUNTER — VIRTUAL VISIT (OUTPATIENT)
Dept: PSYCHOLOGY | Facility: CLINIC | Age: 48
End: 2023-08-21
Payer: COMMERCIAL

## 2023-08-21 DIAGNOSIS — F90.2 ATTENTION DEFICIT HYPERACTIVITY DISORDER (ADHD), COMBINED TYPE: Primary | ICD-10-CM

## 2023-08-21 PROCEDURE — 96131 PSYCL TST EVAL PHYS/QHP EA: CPT | Mod: VID | Performed by: PSYCHOLOGIST

## 2023-08-21 PROCEDURE — 96130 PSYCL TST EVAL PHYS/QHP 1ST: CPT | Mod: VID | Performed by: PSYCHOLOGIST

## 2023-08-21 NOTE — PATIENT INSTRUCTIONS
Coping with Attention-Deficit/Hyperactivity Disorder (ADHD)    If you have ADHD, it can be hard to sit still, pay attention or control impulsive behavior. ADHD can cause problems at home, at school, at work and in social settings. But you can learn ways to control your symptoms. Below are some ideas for coping with the most common ADHD problems.     Memory, Focus, and Managing Time  Be mindful of time. Use a watch, phone, timer, alarm, PDA or computer--anything that keeps accurate time that you can see and hear at all times. Set more than one alarm to remind you how much time you have left for a task. Give yourself more time than you think you need. For important appointments or deadlines, set reminder alarms hours or days ahead of time.   Use a day planner. A day planner can help you manage time and remember responsibilities. Learning to use a planner is just like learning to use any tool--practice makes perfect.   Use lists. Lists and notes can help you keep track of regular tasks, projects, deadlines and appointments. If you decide to use a daily planner, keep all lists and notes inside of it. Use color codes for tasks so you know which ones are the most important.  Learn to say no and set boundaries. Do not take on too many projects or social engagements. Overbooking yourself can be overwhelming and can lead to missed commitments.  Repeat out loud the instructions you have been given. This will help you remember, as well as let others correct you if needed.    Organization  Create space. Ask yourself what you need on a daily basis. Then, find storage bins or closets for things you don t need every day. Have specific areas for things like keys, bills and other items that are easy to misplace. Throw away or donate things you don t need.   Deal with it now. You can avoid forgetfulness, clutter and procrastination by doing things right away, not some time in the future. This includes filing papers, cleaning up  messes, opening and responding to mail and returning phone calls.  Set up a filing system. Use dividers or separate file folders for different types of documents, such as medical records, receipts and pay stubs. Label and color-code your files so that you can quickly find what you need.   Break larger tasks into small, manageable pieces. Write down the steps needed to complete the task. Follow each step in order until the task is done. If needed, take small, timed breaks and return to finish the task.  Organize your work space. Use lists or planners to organize your day. Remove clutter from your desk. Label any storage bins. Reduce distraction as much as possible. Ideas include sitting facing the wall, closing your door or using a white noise machine.    Sitting Still  Move around as needed. Don t fight the urge to move around. If you need to fidget or stand up for a period of time to help maintain attention, then do so. But take care that you re not interrupting others. You may also find it helpful to squeeze a stress ball.  Be active in a useful way. Exercise can burn off extra energy, relieve stress, boost your mood and calm your mind. Meditation, yoga or chika chi can help you better control your attention and impulses.  Stay healthy. Get enough sleep. Avoid caffeine late in the day. Exercise. Create a relaxing bedtime routine. Stick to a schedule or daily routine. Eat small meals throughout the day. Avoid sugar, eat fewer carbohydrates and eat more protein.     Close Relationships  Talk to your loved ones about ADHD. There are many resources available that can help your loved one understand ADHD. See the Resources section below.  Divide daily tasks based on each person s strengths. For example, if you have trouble with organization, you may not want to do financial planning tasks.  If you have trouble with focus, develop a sign that others in your life can use as a gentle reminder to pay attention. The sign  should be small but meaningful to you and the other person.  Improve communication. Use a dry erase board in a common area to help the whole family stay in touch better. Keep regular to-do lists and compare scheduled activities every day. Writing notes to each other is also very helpful.  Be an active listener and try not to interrupt. If you find your mind wandering when others are talking, mentally repeat their words so you can follow the conversation. Ask questions and ask people to repeat what they said if needed. Pay close attention to body language.   Organize your thoughts. If you need to have a serious conversation, write a list of the points you would like to make or the important ideas you want to talk about. This can help you stay focused and remember what you need to say.  Think before speaking. It can be hard to control your impulses when you feel strongly about something. Before saying whatever pops into your head, stop to ask yourself  Will this be helpful?  or  Will this help me get what I want?   Take charge of your life. Work to accept that some things are harder for you. Make a plan to address these troubles and seek the support you need.    Online Resources  [employer]. http://www.[employer].com  ADHD and You.  Tips for Adults with ADHD.  http://www.adhdandyou.com/adhd-patient/adhd-tips-young-adult/  Attention Deficit Disorder Association. http://www.add.org  Attention Deficit Disorder Resources. http://www.addresources.org  Children and Adults with Attention-Deficit/Hyperactivity Disorder (JAYRO). http://www.jayro.org/  Maya Roche.  33 Ways to Get Organized with Adult ADHD.  http://www.additudemag.com/adhd/article/729.html  National Resource Center on ADHD. http://www.lhgb3zmwj.org/  Gini Alcantara.  Daily Living Tips for Adult ADHD.  http://www.medicinenet.com/living_tips_adult_adhd_pictures_slideshow/article.htm  Hang Pierce and Reina Gonzales.  Help for Adult ADD / ADHD.   http://www.helpguide.org/mental/adhd_add_adult_strategies.htm  You can also find many apps for your phone that can help you with common ADHD struggles    Book Resources  Claudio Voss. ADHD in Adults. (2008).  Claudio Voss. Taking Charge of Adult ADHD. (2010).  Yosvany Albrecht. Delivered from Distraction. (2006).  Yosvany Albrecht. Driven to Distraction. (2011).  Joan Daugherty. ADD in the Workplace. (1997).  Joan Daugherty. ADD-Friendly Ways to Organize Your Life. (2002).  Gianna Dixon. The ADHD Effect on Marriage: Understand and Rebuild Your Relationship in Six Steps. (2010).  Fay Davenport and Veronika Dueñas. You Mean I m Not Lazy, Stupid or Crazy? (2006).  Hitesh Hardwick. Understand Your Brain, Get More Done: The ADHD Executive Functions Workbook. (2012).    Support Groups  ADHD Adult Support Group  [address].  7:00 to 8:30 p.m., last Thursday of each month (except December).  Contact/RSVP: [email or web address]    ADHD Adult Support Group  [address].  Thursday 10:00 a.m. to 12:00 p.m. or 7:00 to 9:30 p.m.  Contact/RSVP: Ayush Dobbins. [phone] or [email or web address]     ADHD Adult Support Group for Spouses/Partners of adults with ADHD  [address].  Tuesday 12:00 to 2:00 p.m.   Contact/RSVP: Ayush Dobbins. [phone] or [email or web address]

## 2023-08-21 NOTE — PROGRESS NOTES
"    Psychological Assessment Report    Patient: Joann Maurer  YOB: 1975  MRN: [id number]  Date(s) of assessment: 7/10/2023 and 7/17/2023  Referral Source: María Parker CNP - [place] School Provider  Reason for Referral: assessing reported deficits in executive functioning     IDENTIFYING INFORMATION AND BRIEF HISTORY OF PRESENTING PROBLEM: Joann Maurer is a 48-year-old White woman who presented to the initial diagnostic intake appointments on 7/10/2023 and 7/17/2023 (see diagnostic intake dated 7/17/2023 for more detailed background information) due to primary concerns with inattention and hyperactivity.  The patient reported feeling \"scatterbrained\" and that 2 of her 3 children have been diagnosed with ADHD.  She has been struggling with problems her entire life and always thought that these difficulties were just a part of her personality.    As a child, the patient reported needing to work hard to focus in class and was easily distracted.  She went on to explain that she was written up often for talking to other students in class.  Homework was also reportedly difficult and her father would often help her complete these tasks during elementary school years.  Denied problems with organization/losing items.  She did have a  at [facility] as a result of difficulties.  After high school, the patient began attending college and problems continued.  She explained that she would use a distraction free environment to study because she was so distractible and it felt like she needed to work twice as hard as peers.  She completed her masters degree and stated that this was easier.  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  Currently, the patient reported experiencing the following symptoms: Difficulty sustaining attention (hard to read and hard to pay " "attention in meetings at work), does not listen when spoken to directly (needs repeated information), avoids/dislikes tasks that require sustained mental effort (procrastinates often), loses things often, is easily distracted, is forgetful, feels fidgety, feels \"on the go,\" talks excessively, and often blurts out information.  She is seeking diagnostic clarification and updated treatment recommendations.    Mental Health History: The patient reported that she did experience some postpartum depression symptoms in 2012.  At that time, medications were prescribed.  She stated that she is experiencing some subclinical symptoms currently and attributes these to beginning to go through menopause.  The patient also reported an eating disorder history, as she experienced bulimia while in college.  Meals are currently well balanced but she does engage in some purging occasionally.  The patient denied a history of manic symptoms, anxiety symptoms, social anxiety, phobic responses, symptoms of obsessive-compulsive disorder, trauma, and perceptual difficulties. The patient denied issues with sexual compulsivity and gambling.    Developmental History: The patient reported that she believes that she is the product of a full-term pregnancy and there were no complications during her mother's pregnancy or birth. The patient reported that she believes she met all of her developmental milestones on time.  It is important to note that the patient's mother smoked cigarettes throughout her pregnancy.  She denied a history of head injuries, learning disorders, and special educational programming.  The patient grew up with her mother, father, and 2 sisters in the home.  Parents remain  until her father passed away about 7 years ago.  She recalled arguing and fighting between parents and that her mother was prone to anger.  Described childhood as \"not great.\"    Chemical Dependence/Substance Abuse History: The patient reported " attending outpatient treatment for alcohol while in college as a result of receiving a DUI.  She was able to maintain sobriety for about a year then began drinking occasionally in social situations.  Alcohol use was daily during the COVID-19 pandemic.  However, she indicated that she began gaining weight as a result of this daily use so she has since decreased to use and it is no longer daily.     SOURCES OF DATA/ASSESSMENT: Review of medical and psychiatric records, consideration of behavioral observations during the testing (if applicable), and the results of the psychological tests are all considered in the preparation of this psychological test report. It is important to note that test results comprise a hypothesis of the patient's mental health concerns and are not an independent or conclusive assessment. Test results are combined with the patient's available medical, psychological, behavioral data for an integrated interpretation and report. Due to virtual/remote administration, certain aspects of the assessment process were impacted, such as access to direct patient observation, and maintaining an environment conducive to testing. As such, external factors have the potential to affect the validity of data collected.    TESTS ADMINISTERED:  CNS Vital Signs Neurocognitive Battery  Bipin Adult ADHD Rating Scale-IV: Self and Other Reports (BAARS-IV)  Bipin Functional Impairment Scale: Self and Other Reports (BFIS)  Bipin Deficits in Executive Functioning Scale (BDEFS)  Generalized Anxiety Disorder Questionnaire (JONNY-7)  Patient Health Questionnaire- 9 (PHQ-9)  [place] Multiphasic Personality Inventory - 3 (MMPI - 3)     BEHAVIORAL OBSERVATIONS: The patient was pleasant and cooperative throughout all interview and explanation of testing process. The patient was oriented to person, place, and time. Mood was neutral. Eye contact was adequate and speech was at normal rate and rhythm. Motor activity was  appropriate. Due to virtual/remote administration, direct patient observation was not possible during the testing process, and it is unknown if the patient was able to maintain an environment conducive to testing. As such, external factors have the potential to affect the validity of data collected.     TEST RESULTS: Test results comprise a hypothesis of the patient's mental health concerns and are not an independent or conclusive assessment. Test results are combined with the patient's available medical, psychological, behavioral, and observational data for an integrated interpretation and report.    CNS Vital Signs Neurocognitive Battery  The CNS Vital Signs Neurocognitive Battery is a remotely-administered assessment comprised of seven core subtests to individually measure the patient's verbal memory, visual memory, motor speed, psychomotor speed, reaction time, focus, ability to sustain attention and ability to adapt to changing rules and tasks.      Above average domain scores indicate a standard score (SS) greater than 109 or a Percentile Rank (CO) greater than 74, indicating a high functioning test subject. Average is a SS  or CO 25-74, indicating normal function. Low Average is a SS 80-89 or CO 9-24 indicating a slight deficit or impairment. Below Average is a SS 70-79 or CO 2-8, indicating a moderate level of deficit or impairment. Very Low is a SS less than 70 or a CO less than 2, indicating a deficit and impairment.  Validity Indicator denotes a guideline for representing the possibility of an invalid test or domain score, and can be influenced by patient understanding, effort, or other conditions.    The patient's results are detailed below:    Domain Standard Score Percentile Description Validity   Neurocognitive Index 82 12 Low Average Yes   Composite Memory Measure 85 16 Low Average Yes   Verbal Memory 96 40 Average Yes   Visual Memory 81 10 Low Average Yes   Psychomotor Speed 70 2 Low Yes    Reaction Time 78 7 Low Yes   Complex Attention 96 40 Average Yes   Cognitive Flexibility 79 8 Low  Yes   Processing Speed 88 21 Low Average Yes   Executive Function 81 10 Low Average Yes   Reasoning 62 1 Very Low Yes   Working Memory 91 27 Average Yes   Sustained Attention  70 2 Low Yes   Simple Attention 107 68 Average Yes   Motor Speed 70 2 Low  Yes     Neurocognitive Index (NCI): Measures an average score derived from the domain scores or a general assessment of the overall neurocognitive status of the patient. The patient's NCI score is 82, with a percentile of 12, and falls within the low average range.    Composite Memory: Measures how well subject can recognize, remember, and retrieve words and geometric figures, and is comprised of the Visual and Verbal Memory domains. The patient's Composite Memory score is 85, with a percentile of 16, and falls within the low average range.    Verbal Memory: Measures how well subject can recognize, remember, and retrieve words. The patient's Verbal Memory score is 96, with a percentile of 40, and falls within the average range.    Visual Memory: Measures how well subject can recognize, remember and retrieve geometric figures. The patient's Visual Memory score is 81, with a percentile of 10, and falls within the low average range.    Psychomotor Speed: Measures how well a subject perceives, attends, responds to complex visual-perceptual information and performs simple fine motor coordination, and is comprised of the Motor Speed and Processing Speed indexes. The patient's Psychomotor Speed score is 70, with a percentile of 2, and falls within the low range.    Reaction Time: Measures how quickly the subject can react, in milliseconds, to a simple and increasingly complex direction set. The patient's Reaction Time score is 78, with a percentile of 7, and falls within the low range.    Complex Attention: Measures the ability to track and respond to a variety of stimuli over  lengthy periods of time and/or perform complex mental tasks requiring vigilance quickly and accurately. The patient's Complex Attention score is 96, with a percentile of 40, and falls within the average range.    Cognitive Flexibility: Measures how well subject is able to adapt to rapidly changing and increasingly complex set of directions and/or to manipulate the information. The patient's Cognitive Flexibility score is 79, with a percentile of 8, and falls within the low range.    Processing Speed: Measures how well a subject recognizes and processes information i.e., perceiving, attending/responding to incoming information, motor speed, fine motor coordination, and visual-perceptual ability. The patient's Processing Speed score is 88, with a percentile of 21, and falls within the low average range.    Executive Function: Measures how well a subject recognizes rules, categories, and manages or navigates rapid decision making. The patient's Executive Function score is 81, with a percentile of 10, and falls within the low average range.    Reasoning: Measures how well a subject can perceive and understand the meaning of visual or abstract information and recognizing relationships between visual-abstract concepts. The patient's Reasoning score is 62, with a percentile of 1, and falls in the very low range.     Working Memory: Measures how well a subject can perceive and attend to symbols using short-term memory processes. Also measures the ability to carry out short-term memory tasks that support decision making, problem solving, planning, and execution. The patient's Working Memory score is 91, with a percentile of 27, and falls in the average range.    Sustained Attention: Measures how well a subject can direct and focus cognitive activity on specific stimuli. Also measurs how well a subject can focus and complete task or activity, sequence action, and focus during complex thought. The patient's Sustained Attention  "score is 70, with a percentile of 2, and falls in the low range.    Simple Attention: Measures the ability to track and respond to a single defined stimulus over lengthy periods of time while performing vigilance and response inhibition quickly and accurately to a simple task. The patient's Simple Attention score is 107, with a percentile of 68, and falls within the average range.    Motor Speed: Measure: Ability to perform simple movements to produce and satisfy an intention towards a manual action and goal. The patient's Motor Speed score is 70, with a percentile of 2, and falls within the low range.    Bipin Adult ADHD Rating Scale-IV: Self and Other Reports (BAARS-IV)  The BAARS-IV assesses for symptoms of ADHD that are experienced in one's daily life. This assessment measure includes self and collateral rating scales designed to provide information regarding current and childhood symptoms of ADHD including inattention, hyperactivity, and impulsivity. Self-report scores are reported as percentiles. Scores at the 76th-83rd percentile are considered marginal, scores at the 84th-92nd percentile are considered borderline, scores at the 93rd-95th percentile are considered mild, scores at the 96th-98th percentile are considered moderate, and those at the 99th percentile are considered severe. Collateral or \"other\" rating scales are reported as number of symptoms observed in comparison to those reported by the client. Norms and percentile scores are not available for collateral reports.     Current Symptoms Scale--Self Report:   Client completed the self-report inventory of current symptoms. The results indicate that the client's Total ADHD Score was 48 which places the patient in the 97th percentile for overall ADHD symptoms. In addition, the client endorsed 5/9 (96th percentile) Inattention symptoms, 6/9 (98th percentile) Hyperactivity-Impulsivity symptoms, and 3/9 (88th percentile) Sluggish Cognitive Tempo " symptoms. Client indicated that the reported symptoms have resulted in impaired functioning in school, home, work, and social relationships. Overall, the results suggest the client is experiencing moderate ADHD symptoms.     Current Symptoms Scale--Other Report:  Client's  completed the collateral report inventory of current symptoms. Based on the collateral contact's observation of symptoms, the client demonstrates 1/9 Inattention symptoms, 1/5 Hyperactivity symptoms, 4/4 Impulsivity symptoms, and 1/9 Sluggish Cognitive Tempo symptoms. The client's Total ADHD Score was 38. The collateral contact indicated the client demonstrates impaired functioning at home and in social relationships.  The collateral- and self-report scores are significantly different.    Childhood Symptoms Scale--Self-Report:  Client completed the self-report inventory of childhood symptoms. The results indicate that the client's Total ADHD Score was 47 which places the patient in the 94th percentile for overall ADHD symptoms in childhood. In addition, the client endorsed 5/9 (92nd percentile) Inattention symptoms and 6/9 (95th percentile) Hyperactivity-Impulsivity symptoms. Client indicated that the reported symptoms resulted in impaired functioning in school, home, and social relationships. Overall, the results suggest the client experienced mild symptoms of ADHD as a child.     Childhood Symptoms Scale--Other Report:  Client's mother completed the collateral report inventory of childhood symptoms. Based on the collateral contact's recollection of client's childhood symptoms, the client demonstrated 2/9 Inattention symptoms and 9/9 Hyperactivity-Impulsivity symptoms. The client's Total ADHD Score was 46. The collateral contact indicated the client demonstrates impaired functioning in school, home, and social relationships. The collateral- and self-report scores are not significantly different.                           Bipin Chatman  "Impairment Scale: Self and Other Reports (BFIS)  The BFIS is used to assess an individuals' psychosocial impairment in major life/daily activities that may be due to a mental health disorder. This assessment measure includes self and collateral rating scales. Self-report scores are reported as percentiles. Scores at the 76th-83rd percentile are considered marginal, scores at the 84th-92nd percentile are considered borderline, scores at the 93rd-95th percentile are considered mild, scores at the 96th-98th percentile are considered moderate, and those at the 99th percentile are considered severe. Collateral or \"other\" rating scales are reported as number of symptoms observed in comparison to those reported by the client. Norms and percentile scores are not available for collateral reports.     Results indicate the client identified impairment (scores at or greater than 93rd percentile) in the following areas: marriage/cohabiting/dating, money management, and sexual relations.  The client's Mean Impairment Score was 3.1 (51-75 percentile) indicating the client is reporting 20% impairment in functioning across domains. Client's  completed the collateral rating scale, which indicated similar results.     Bipin Deficits in Executive Functioning Scale (BDEFS)  The BDEFS is a measure used for evaluating dimensions of adult executive functioning in daily life. This assessment measure includes self and collateral rating scales. Self-report scores are reported as percentiles. Scores at the 76th-83rd percentile are considered marginal, scores at the 84th-92nd percentile are considered borderline, scores at the 93rd-95th percentile are considered mild, scores at the 96th-98th percentile are considered moderate, and those at the 99th percentile are considered severe. Collateral or \"other\" rating scales are reported as number of symptoms observed in comparison to those reported by the client. Norms and percentile scores " are not available for collateral reports.     Results indicate the client's Total Executive Functioning Score was 204 (94th percentile). The ADHD-Executive Functioning Index score was 24 (93rd percentile). These scores suggest the client has mild deficits in executive functioning. Results indicate the client identified significant deficits in the following areas: self-management to time (mild deficits), self-organization/problem-solving (severe deficits), self-restraint (borderline deficits), self-motivation (no significant deficits) and self-regulation of emotions (marginal deficits). Client's  completed the collateral rating scale, which indicated discrepant results. The collateral contact's scores were generally lower than the client's report.    Generalized Anxiety Disorder Questionnaire (JONNY-7)  This questionnaire is designed to assess for anxiety in adults. Based on the score, the patient is experiencing mild symptoms of anxiety. Client identified the following symptoms of anxiety: feeling on edge/nervous/anxious, difficulty controlling worry, worrying about many different things, trouble relaxing, being restless, becoming easily annoyed or irritable, and feeling something awful might happen.    Patient Health Questionnaire- 9 (PHQ-9)   This questionnaire is designed to assess for depression in adults. Based on the score, the patient is experiencing moderate symptoms of depression. Client identified the following symptoms of depression: depressed mood, difficulty with sleep, feeling tired or having little energy, poor appetite or overeating, feeling bad about self, poor concentration, and restlessness or lethargy.    [place] Multiphasic Personality Inventory - 3 (MMPI-3)    The MMPI-3 was administered to evaluate current level of emotional distress. Validity profile indicates that the patient appears to have answered in a generally straightforward and consistent manner, and obtained results are  considered to be reliable and valid in representing current psychological status. No items were omitted.      Somatic/Cognitive Dysfunction: The patient reported problematic eating behaviors.  She may have concerns about her weight and body shape.  She also reported a diffuse pattern of cognitive difficulties.  It is possible that she may be experiencing difficulties with attention and concentration.    Emotional Dysfunction: The patient probably does not cope well with stress and may have a low tolerance for frustrating situations.    Thought Dysfunction: Thought processes are likely realistic and logical.    Behavioral Dysfunction: The patient is probably passive, indecisive, and inefficacious.  She may experience a level of subjective incompetence and feels shame around her perceived difficulties.    Interpersonal Functioning: In her relationships, the patient is probably sociable and friendly.  She is likely passive and submissive in her dynamics with others.    SUMMARY: Joann Maurer is a 48-year-old White woman who completed psychological testing remotely/virtually due to the COVID-19 pandemic. Testing was requested to provide updated diagnostic clarification and necessary treatment recommendations.    Patient first completed a diagnostic interview in which mental health symptoms, ADHD symptoms, and background information was gathered. Patient self-reported six symptoms of inattention, at least four symptoms of hyperactivity, and indicated that her abilities to function effectively at home and work are significantly impaired. Further, her self-reported symptoms on Bipin measures of ADHD symptoms were consistent with this information.  Both the patient and her mother recalled significant symptoms in childhood.    An objective measure of personality indicated attention and concentration concerns.  It is possible that the patient does not cope well with stress and may have a low tolerance for frustrating  situations.  She probably sees herself as inefficacious and may experience shame regarding her own perceived difficulties.    An objective measure of neurocognitive functioning was also administered.  Results first indicated verbal memory to be scored in the overall average range.  The patient was able to recognize target words from a word list in the average range both immediately and after a delay.  Visual memory scored to be in the overall low average range.  The patient was able to recognize target shapes immediately in the low range in the low average range after a delay.  Reasoning scored to be in the very low range, indicating problems with problem solving and nonverbal reasoning compared to peers.  This is inconsistent with the patient's level of functioning, so this is not necessarily a valid representation of her true capabilities.  On the reasoning subtest, the patient was also shown to have worked at a pace much faster than peers, so it is possible that if she would have slowed down, she may have been able to work more effectively.  Processing speed scored to be in the low average range, indicating that she was able to scan and respond to visual stimuli slower than peers.  On the Stroop test, the patient responded to stimuli slower than peers and was able to inhibit the salient, but incorrect, response in the low average range.  On a test of shifting attention the patient was able to adjust her responses according to changing rule sets in the average range but worked at a pace slower than peers.  As such, cognitive flexibility was scored to be in the low range while executive functioning was scored to be in the low average range.  Overall, there seems to be a pattern of difficulties with balancing accuracy and speed.  ADHD is associated with significant deficits in attention, processing speed, and executive functioning.  The patient demonstrated problems in many of these areas.  This information,  combined with self and other current and childhood history of symptoms, indicate that problems can be conceptualized as having a neurodevelopmental basis.  This is especially the case with a lack of other significant mental health symptoms.    Referral Question Response: DSM-5 criteria for ADHD:   A. Symptom Count - Are there sufficient symptoms for the diagnosis? Yes, patient did endorse sufficient inattention and hyperactivity symptoms.   B. Onset - Were several symptoms present before 10 years of age? Yes, a significant number of symptoms reportedly began in childhood.   C. Pervasiveness - Are several symptoms present in at least two settings? Yes, patient reported that symptoms are problematic at home and work.   D. Impairment - Do symptoms interfere with or reduce the quality of functioning? Yes, patient is unable to complete daily tasks effectively.   E. Exclusions - Are symptoms better explained by another disorder or factor?  No, symptoms are better explained by another mental health disorder. Difficulties are explained by an organic basis of inattention.     DIAGNOSES:  F90.2 Attention-Deficit/Hyperactivity Disorder, combined presentation, moderate    PLAN OF CARE:  Discuss the following with your primary care provider:  Consider a trial of a stimulant medication. This may help alleviate some of the patient's attentional symptoms.     Consider initiating individual psychotherapy to help alleviate mood symptoms. Research indicates that outcomes are best with both medication and therapy. You can call the [facility] line at [phone]. NURA Cunningham is recommended specifically because she works with individuals with ADHD.    RECOMMENDATIONS:  Due to the patient's reported attention, concentration, and mood difficulties, the following health/lifestyle changes when combined, can significantly improve symptoms:   Avoid simple carbohydrates at breakfast. Aim for only complex  carbohydrates and lean protein for your morning meal.   Engage in aerobic exercise 3 times per week for 30 minutes, ensuring that your heart rate stays within your training zone. Further, reading the book,  Spark,  by Jose A Albrecht M.D can help the patient understand the benefits of exercise on the brain.   Research suggest that taking a high-quality multi-vitamin and antioxidant (1/2 cup of blueberries) daily in conjunction with balanced nutrition can be helpful.  Aim for the high end of daily water intake: around 72 ounces per day.  Ensure regular meals and snacks to maintain optimal attention.    The following may be beneficial in managing some of the patient's attention and concentration difficulties:  Due to the patient's difficulties with attention and concentration, consider working in a completely distraction-free area while completing tasks. Workspaces should be completely clear except for the materials needed for the current task. Both visual and auditory distractions should be decreased as much as possible.  Considering decreased ability to focus and maintain attention, it is recommended that the patient take frequent breaks while completing tasks. This will help to maintain attention and effort. The patient may benefit from the use of a [employer] Timer. The timer works by using built-in break times. After working on a task consistently for 25 minutes, the timer reminds the user to take a five-minute break before continuing, etc. A [employer] timer can be downloaded as a free johnson to a phone or tablet.  Due to the patient's attentional and concentration symptoms, it is recommended to increase organization with the use of lists and calendars. Significantly increasing structure to the day and adhering to a set schedule can increase your ability to complete responsibilities, track deadline, etc. Breaking these tasks down into their component parts and recording them in a calendar/planner will likely be beneficial.  Patient would benefit from setting feasible timelines for completion of activities. By establishing clear priorities for completing tasks, you can more likely complete the most important tasks first. The patient may also choose to elect to a friend or family member to help hold them accountable.    Avoid multitasking. Attempting to work on multiple tasks and projects the same increases the likelihood that an error will occur. Focus on one task at a time.    The patient may benefit from engaging in mindfulness practices. This may include breathing techniques, apps that provide guided meditation, or more interactive activities such as coloring.    See the attached tip sheet for more ideas as well as online and book resources.       Iona Palmer PsyD, LP  Clinical Psychologist

## 2023-08-21 NOTE — PROGRESS NOTES
[facility]   Mental Health & Addiction Services     Progress Note - ADHD Feedback Session     Patient Name: Joann Maurer  Date: 2023       Service Type:  Individual       Session Start Time: 5:15pm  Session End Time:  5:48pm     Session Length: 33 minutes    Session #: 3    Attendees: Patient attended alone    Service Modality: Video Visit:      Provider verified identity through the following two step process.  Patient provided:  Patient  and Patient address    Telemedicine Visit: The patient's condition can be safely assessed and treated via synchronous audio and visual telemedicine encounter.      Reason for Telemedicine Visit: Services only offered telehealth    Originating Site (Patient Location): Patient's home    Distant Site (Provider Location): Provider Remote Setting- Home Office    Consent:  The patient/guardian has verbally consented to: the potential risks and benefits of telemedicine (video visit) versus in person care; bill my insurance or make self-payment for services provided; and responsibility for payment of non-covered services.     Patient would like the video invitation sent by:  Send to e-mail at: [email or web address]    Mode of Communication:  [facility] Conference [facility]    Distant Location (Provider):  Off-site    As the provider I attest to compliance with applicable laws and regulations related to telemedicine.          2018    11:44 AM 7/10/2023    10:34 AM 2023     1:41 PM   PHQ   PHQ-9 Total Score 2 5 5   Q9: Thoughts of better off dead/self-harm past 2 weeks Not at all Not at all Not at all           2018    11:44 AM 7/10/2023    10:34 AM   JONNY-7 SCORE   Total Score 13 6         DATA      Progress Since Last Session (Related to Symptoms / Goals / Homework):   Symptoms: Stable.    Homework: Completed.      Treatment Objective(s) Addressed in This Session:   Provided feedback on ADHD  evaluation. Reviewed test results in depth. Plan of care and recommendations were discussed based on testing data. See full report attached on secondary note in this encounter.     Intervention:   Provided feedback to patient regarding testing results, diagnoses, and treatment recommendations. Test results are consistent with an ADHD diagnosis. Symptoms are not better explained by another mental health disorder. Personalized suggestions regarding symptoms were offered. Patient had the opportunity to ask questions; she expressed understanding.        ASSESSMENT: Current Emotional / Mental Status (status of significant symptoms):   Risk status (Self / Other harm or suicidal ideation)   Patient denies current fears or concerns for personal safety.   Patient denies current or recent suicidal ideation or behaviors.   Patient denies current or recent homicidal ideation or behaviors.   Patient denies current or recent self injurious behavior or ideation.   Patient denies other safety concerns.   Patient reports there has been no change in risk factors since their last session.     Patient reports there has been no change in protective factors since their last session.     Recommended that patient call 911 or go to the local ED should there be a change in any of these risk factors.     Appearance:   Appropriate    Eye Contact:   Good    Psychomotor Behavior: Normal    Attitude:   Cooperative    Orientation:   All   Speech    Rate / Production: Normal     Volume:  Normal    Mood:    Normal   Affect:    Appropriate    Thought Content:  Clear    Thought Form:  Coherent  Logical    Insight:    Good      Medication Review:   No current psychiatric medications prescribed     Medication Compliance:   NA     Changes in Health Issues:   None reported     Chemical Use Review:   Substance Use: See report.      Nicotine Use: No current tobacco use.      Diagnosis:  1. Attention deficit hyperactivity disorder (ADHD), combined type         PLAN:   Recommendations are outlined in full evaluation report (attached to this encounter).   Patient indicated understanding and will contact the clinic if there are further questions.    Parts of this documentation may have been completed using dictation software. Potential errors may result and are unintentional.       Iona Palmer PsyD, LP  Clinical Psychologist         Psychological Testing Services Summary       Testing Evaluation Services Base: 15522  (first 60 mins) Add-on: 51626  (each addtl 60 mins)   Record Review and Clarify Referral Question   10:50am-11:00am on 7/10/2023 10 minutes   Clinical Decision Making/Battery Modification   9:30am-9:45am on 7/17/2023 15 minutes   Integration/Report Generation   9:00am-10:00am on 8/20/2023 ([facility])  10:00am-10:30am on 8/20/2023 ([facility])  10:30am-11:15am on 8/20/2023 ([facility]MPI-3)  11:15am-12:30pm on 8/20/2023 (Final Report)   60 minutes  30 minutes  45 minutes  75 minutes   Interactive Feedback Session   5:15pm-5:48pm on 8/21/2023 33 minutes   Post-Service Work   5:48pm-6:03pm on 8/21/2023 15 minutes   Total Time: 283 minutes   Total Units: 1 4       Diagnoses:   F90.2 Attention-Deficit/Hyperactivity Disorder, combined presentation, moderate

## 2023-09-28 ENCOUNTER — MYC MEDICAL ADVICE (OUTPATIENT)
Dept: FAMILY MEDICINE | Facility: CLINIC | Age: 48
End: 2023-09-28

## 2023-09-28 ENCOUNTER — OFFICE VISIT (OUTPATIENT)
Dept: FAMILY MEDICINE | Facility: CLINIC | Age: 48
End: 2023-09-28
Payer: COMMERCIAL

## 2023-09-28 VITALS
SYSTOLIC BLOOD PRESSURE: 164 MMHG | HEIGHT: 66 IN | BODY MASS INDEX: 25.23 KG/M2 | WEIGHT: 157 LBS | OXYGEN SATURATION: 98 % | DIASTOLIC BLOOD PRESSURE: 112 MMHG | HEART RATE: 85 BPM | RESPIRATION RATE: 20 BRPM | TEMPERATURE: 98.3 F

## 2023-09-28 DIAGNOSIS — Z23 ENCOUNTER FOR IMMUNIZATION: ICD-10-CM

## 2023-09-28 DIAGNOSIS — F90.2 ADHD (ATTENTION DEFICIT HYPERACTIVITY DISORDER), COMBINED TYPE: Primary | ICD-10-CM

## 2023-09-28 LAB
AMPHETAMINES UR QL: NOT DETECTED
BARBITURATES UR QL SCN: NOT DETECTED
BENZODIAZ UR QL SCN: NOT DETECTED
BUPRENORPHINE UR QL: NOT DETECTED
CANNABINOIDS UR QL: NOT DETECTED
COCAINE UR QL SCN: NOT DETECTED
D-METHAMPHET UR QL: NOT DETECTED
METHADONE UR QL SCN: NOT DETECTED
OPIATES UR QL SCN: NOT DETECTED
OXYCODONE UR QL SCN: NOT DETECTED
PCP UR QL SCN: NOT DETECTED
TRICYCLICS UR QL SCN: NOT DETECTED

## 2023-09-28 PROCEDURE — 90471 IMMUNIZATION ADMIN: CPT | Performed by: NURSE PRACTITIONER

## 2023-09-28 PROCEDURE — 99214 OFFICE O/P EST MOD 30 MIN: CPT | Mod: 25 | Performed by: NURSE PRACTITIONER

## 2023-09-28 PROCEDURE — 90682 RIV4 VACC RECOMBINANT DNA IM: CPT | Performed by: NURSE PRACTITIONER

## 2023-09-28 PROCEDURE — 80306 DRUG TEST PRSMV INSTRMNT: CPT | Performed by: NURSE PRACTITIONER

## 2023-09-28 RX ORDER — DEXTROAMPHETAMINE SACCHARATE, AMPHETAMINE ASPARTATE MONOHYDRATE, DEXTROAMPHETAMINE SULFATE AND AMPHETAMINE SULFATE 2.5; 2.5; 2.5; 2.5 MG/1; MG/1; MG/1; MG/1
10 CAPSULE, EXTENDED RELEASE ORAL DAILY
Qty: 30 CAPSULE | Refills: 0 | Status: SHIPPED | OUTPATIENT
Start: 2023-09-28 | End: 2023-11-13 | Stop reason: DRUGHIGH

## 2023-09-28 ASSESSMENT — PAIN SCALES - GENERAL: PAINLEVEL: NO PAIN (0)

## 2023-09-28 NOTE — PATIENT INSTRUCTIONS
Take adderall XR daily as prescribed.  Follow-up in clinic in 1 month for recheck.  Urine today to rule out other drug use.  Alcohol moderation and try to skip dose that day if possible.  Get blood pressure cuff and check this over the next 3 days prior to starting medication at home to see if we need to treat blood pressure prior to starting medication and let me know with [employer] message.  These are the instructions from psychiatry    PLAN OF CARE:  Discuss the following with your primary care provider:  Consider a trial of a stimulant medication. This may help alleviate some of the patient's attentional symptoms.      Consider initiating individual psychotherapy to help alleviate mood symptoms. Research indicates that outcomes are best with both medication and therapy. You can call the [facility] Access line at [phone]. NURA Cunningham is recommended specifically because she works with individuals with ADHD.     RECOMMENDATIONS:  Due to the patient's reported attention, concentration, and mood difficulties, the following health/lifestyle changes when combined, can significantly improve symptoms:   Avoid simple carbohydrates at breakfast. Aim for only complex carbohydrates and lean protein for your morning meal.   Engage in aerobic exercise 3 times per week for 30 minutes, ensuring that your heart rate stays within your training zone. Further, reading the book,  Spark,  by Jose A Albrecht M.D can help the patient understand the benefits of exercise on the brain.   Research suggest that taking a high-quality multi-vitamin and antioxidant (1/2 cup of blueberries) daily in conjunction with balanced nutrition can be helpful.  Aim for the high end of daily water intake: around 72 ounces per day.  Ensure regular meals and snacks to maintain optimal attention.     The following may be beneficial in managing some of the patient's attention and concentration difficulties:  Due to the patient's  difficulties with attention and concentration, consider working in a completely distraction-free area while completing tasks. Workspaces should be completely clear except for the materials needed for the current task. Both visual and auditory distractions should be decreased as much as possible.  Considering decreased ability to focus and maintain attention, it is recommended that the patient take frequent breaks while completing tasks. This will help to maintain attention and effort. The patient may benefit from the use of a [employer] Timer. The timer works by using built-in break times. After working on a task consistently for 25 minutes, the timer reminds the user to take a five-minute break before continuing, etc. A [employer]utor timer can be downloaded as a free johnson to a phone or tablet.  Due to the patient's attentional and concentration symptoms, it is recommended to increase organization with the use of lists and calendars. Significantly increasing structure to the day and adhering to a set schedule can increase your ability to complete responsibilities, track deadline, etc. Breaking these tasks down into their component parts and recording them in a calendar/planner will likely be beneficial. Patient would benefit from setting feasible timelines for completion of activities. By establishing clear priorities for completing tasks, you can more likely complete the most important tasks first. The patient may also choose to elect to a friend or family member to help hold them accountable.     Avoid multitasking. Attempting to work on multiple tasks and projects the same increases the likelihood that an error will occur. Focus on one task at a time.     The patient may benefit from engaging in mindfulness practices. This may include breathing techniques, apps that provide guided meditation, or more interactive activities such as coloring.

## 2023-09-28 NOTE — PROGRESS NOTES
Assessment & Plan     ADHD (attention deficit hyperactivity disorder), combined type  Patient would prefer controlled release, so starting adderall 10 mg XR daily.  Will follow-up in 1 month and recheck how things are going.  We discussed alcohol use on medication can increase potency of the medication and I advise to use caution and moderation or not take her dose that day if not needed.  BP is elevated due to white coat syndrome.  Plan to have patient check BP at home when she is relaxed to see what her BP is prior to starting medication and we may need to treat this while on the medication if not within normal limits.  Patient will Mychart me the blood pressures from home.  Urine drug screen and non-opoid agreement reviewed and signed today.  Information provided for recommendations from Psychiatry also copied and given to patient.  - amphetamine-dextroamphetamine (ADDERALL XR) 10 MG 24 hr capsule; Take 1 capsule (10 mg) by mouth daily  - Drug Abuse Screen Panel 13, Urine (Pain Care Map) - lab collect; Future  - PRIMARY CARE FOLLOW-UP SCHEDULING; Future  - Drug Abuse Screen Panel 13, Urine (Pain Care Map) - lab collect    Encounter for immunization  - INFLUENZA VACCINE 18-64Y (FLUBLOK)    See Patient Instructions    Paula Walden NP  [facility]    Sage David is a 48 year old, presenting for the following health issues:  A.D.H.D (Pt was dx with ADHD recently and would like to talk about medication therapy and management.)        9/28/2023    10:12 AM   Additional Questions   Roomed by Daiana GUIDRY MA   Accompanied by Self         9/28/2023    10:12 AM   Patient Reported Additional Medications   Patient reports taking the following new medications None     Patient was seen by Psych with following DX and Plan of care recommendations:    DIAGNOSES:  F90.2 Attention-Deficit/Hyperactivity Disorder, combined presentation, moderate     PLAN OF CARE:  Discuss the following with your  primary care provider:  Consider a trial of a stimulant medication. This may help alleviate some of the patient's attentional symptoms.      Consider initiating individual psychotherapy to help alleviate mood symptoms. Research indicates that outcomes are best with both medication and therapy. You can call the [facility] Access line at [phone]. NURA Cunningham is recommended specifically because she works with individuals with ADHD.     RECOMMENDATIONS:  Due to the patient's reported attention, concentration, and mood difficulties, the following health/lifestyle changes when combined, can significantly improve symptoms:   Avoid simple carbohydrates at breakfast. Aim for only complex carbohydrates and lean protein for your morning meal.   Engage in aerobic exercise 3 times per week for 30 minutes, ensuring that your heart rate stays within your training zone. Further, reading the book,  Spark,  by Jose A Albrecht M.D can help the patient understand the benefits of exercise on the brain.   Research suggest that taking a high-quality multi-vitamin and antioxidant (1/2 cup of blueberries) daily in conjunction with balanced nutrition can be helpful.  Aim for the high end of daily water intake: around 72 ounces per day.  Ensure regular meals and snacks to maintain optimal attention.     The following may be beneficial in managing some of the patient's attention and concentration difficulties:  Due to the patient's difficulties with attention and concentration, consider working in a completely distraction-free area while completing tasks. Workspaces should be completely clear except for the materials needed for the current task. Both visual and auditory distractions should be decreased as much as possible.  Considering decreased ability to focus and maintain attention, it is recommended that the patient take frequent breaks while completing tasks. This will help to maintain attention and effort.  The patient may benefit from the use of a [employer] Timer. The timer works by using built-in break times. After working on a task consistently for 25 minutes, the timer reminds the user to take a five-minute break before continuing, etc. A [employer] timer can be downloaded as a free johnson to a phone or tablet.  Due to the patient's attentional and concentration symptoms, it is recommended to increase organization with the use of lists and calendars. Significantly increasing structure to the day and adhering to a set schedule can increase your ability to complete responsibilities, track deadline, etc. Breaking these tasks down into their component parts and recording them in a calendar/planner will likely be beneficial. Patient would benefit from setting feasible timelines for completion of activities. By establishing clear priorities for completing tasks, you can more likely complete the most important tasks first. The patient may also choose to elect to a friend or family member to help hold them accountable.     Avoid multitasking. Attempting to work on multiple tasks and projects the same increases the likelihood that an error will occur. Focus on one task at a time.     The patient may benefit from engaging in mindfulness practices. This may include breathing techniques, apps that provide guided meditation, or more interactive activities such as coloring.    History of Present Illness       Reason for visit:  Get on meds for ADHD  Symptom onset:  More than a month  Symptoms include:  In my report  Symptom intensity:  Severe  Symptom progression:  Staying the same  Had these symptoms before:  Yes  Has tried/received treatment for these symptoms:  No  What makes it worse:  N/a  What makes it better:  Fresh air and walks    She eats 2-3 servings of fruits and vegetables daily.She consumes 0 sweetened beverage(s) daily.She exercises with enough effort to increase her heart rate 30 to 60 minutes per day.  She exercises  "with enough effort to increase her heart rate 5 days per week.   She is taking medications regularly.  A.D.H.D       Review of Systems   CONSTITUTIONAL: NEGATIVE for fever, chills, change in weight  RESP: NEGATIVE for significant cough or SOB  CV: NEGATIVE for chest pain, palpitations or peripheral edema  PSYCHIATRIC: NEGATIVE for changes in mood or affect  ROS otherwise negative      Objective    BP (!) 164/112 (BP Location: Left arm, Patient Position: Sitting, Cuff Size: Adult Regular)   Pulse 85   Temp 98.3  F (36.8  C) (Tympanic)   Resp 20   Ht 1.664 m (5' 5.5\")   Wt 71.2 kg (157 lb)   LMP  (LMP Unknown)   SpO2 98%   BMI 25.73 kg/m    Body mass index is 25.73 kg/m .  Physical Exam   GENERAL: healthy, alert and no distress  RESP: lungs clear to auscultation - no rales, rhonchi or wheezes  CV: regular rate and rhythm, normal S1 S2, no S3 or S4, no murmur, click or rub, no peripheral edema and peripheral pulses strong  PSYCH: mentation appears normal, affect normal/bright        "

## 2023-09-28 NOTE — PROGRESS NOTES
"  {PROVIDER CHARTING PREFERENCE:154392}    Subjective   Joann is a 48 year old, presenting for the following health issues:  A.D.H.D (Pt was dx with ADHD recently and would like to talk about medication therapy and management.)        9/28/2023    10:12 AM   Additional Questions   Roomed by Daiana GUIDRY MA   Accompanied by Self         9/28/2023    10:12 AM   Patient Reported Additional Medications   Patient reports taking the following new medications None       History of Present Illness       Reason for visit:  Get on meds for ADHD  Symptom onset:  More than a month  Symptoms include:  In my report  Symptom intensity:  Severe  Symptom progression:  Staying the same  Had these symptoms before:  Yes  Has tried/received treatment for these symptoms:  No  What makes it worse:  N/a  What makes it better:  Fresh air and walks    She eats 2-3 servings of fruits and vegetables daily.She consumes 0 sweetened beverage(s) daily.She exercises with enough effort to increase her heart rate 30 to 60 minutes per day.  She exercises with enough effort to increase her heart rate 5 days per week.   She is taking medications regularly.       {MA/LPN/RN Pre-Provider Visit Orders- hCG/UA/Strep (Optional):629359}  {SUPERLIST (Optional):852506}  {additonal problems for provider to add (Optional):750659}      Review of Systems   {ROS COMP (Optional):703062}      Objective    BP (!) 164/112 (BP Location: Left arm, Patient Position: Sitting, Cuff Size: Adult Regular)   Pulse 85   Temp 98.3  F (36.8  C) (Tympanic)   Resp 20   Ht 1.664 m (5' 5.5\")   Wt 71.2 kg (157 lb)   LMP  (LMP Unknown)   SpO2 98%   BMI 25.73 kg/m    Body mass index is 25.73 kg/m .  Physical Exam   {Exam List (Optional):612000}    {Diagnostic Test Results (Optional):835056}    {AMBULATORY ATTESTATION (Optional):678874}            DIAGNOSES:  F90.2 Attention-Deficit/Hyperactivity Disorder, combined presentation, moderate     PLAN OF CARE:  Discuss the following with " your primary care provider:  Consider a trial of a stimulant medication. This may help alleviate some of the patient's attentional symptoms.      Consider initiating individual psychotherapy to help alleviate mood symptoms. Research indicates that outcomes are best with both medication and therapy. You can call the [facility] line at [phone]. NURA Cunningham is recommended specifically because she works with individuals with ADHD.     RECOMMENDATIONS:  Due to the patient's reported attention, concentration, and mood difficulties, the following health/lifestyle changes when combined, can significantly improve symptoms:   Avoid simple carbohydrates at breakfast. Aim for only complex carbohydrates and lean protein for your morning meal.   Engage in aerobic exercise 3 times per week for 30 minutes, ensuring that your heart rate stays within your training zone. Further, reading the book,  Spark,  by Jose A Albrecht M.D can help the patient understand the benefits of exercise on the brain.   Research suggest that taking a high-quality multi-vitamin and antioxidant (1/2 cup of blueberries) daily in conjunction with balanced nutrition can be helpful.  Aim for the high end of daily water intake: around 72 ounces per day.  Ensure regular meals and snacks to maintain optimal attention.     The following may be beneficial in managing some of the patient's attention and concentration difficulties:  Due to the patient's difficulties with attention and concentration, consider working in a completely distraction-free area while completing tasks. Workspaces should be completely clear except for the materials needed for the current task. Both visual and auditory distractions should be decreased as much as possible.  Considering decreased ability to focus and maintain attention, it is recommended that the patient take frequent breaks while completing tasks. This will help to maintain attention and  effort. The patient may benefit from the use of a [employer] Timer. The timer works by using built-in break times. After working on a task consistently for 25 minutes, the timer reminds the user to take a five-minute break before continuing, etc. A [employer] timer can be downloaded as a free johnson to a phone or tablet.  Due to the patient's attentional and concentration symptoms, it is recommended to increase organization with the use of lists and calendars. Significantly increasing structure to the day and adhering to a set schedule can increase your ability to complete responsibilities, track deadline, etc. Breaking these tasks down into their component parts and recording them in a calendar/planner will likely be beneficial. Patient would benefit from setting feasible timelines for completion of activities. By establishing clear priorities for completing tasks, you can more likely complete the most important tasks first. The patient may also choose to elect to a friend or family member to help hold them accountable.     Avoid multitasking. Attempting to work on multiple tasks and projects the same increases the likelihood that an error will occur. Focus on one task at a time.     The patient may benefit from engaging in mindfulness practices. This may include breathing techniques, apps that provide guided meditation, or more interactive activities such as coloring.

## 2023-09-28 NOTE — LETTER
[facility]  09/28/23  Patient: Joann Maurer  YOB: 1975  Medical Record Number: [id number]                                                                                  Non-Opioid Controlled Substance Agreement    This is an agreement between you and your provider regarding safe and appropriate use of controlled substances prescribed by your care team. Controlled substances are?medicines that can cause physical and mental dependence (abuse).     There are strict laws about having and using these medicines. We here at [facility] are  committed to working with you in your efforts to get better. To support you in this work, we'll help you schedule regular office appointments for medicine refills. If we must cancel or change your appointment for any reason, we'll make sure you have enough medicine to last until your next appointment.     As a Provider, I will:   Listen carefully to your concerns while treating you with respect.   Recommend a treatment plan that I believe is in your best interest and may involve therapies other than medicine.    Talk with you often about the possible benefits and the risk of harm of any medicine that we prescribe for you.  Assess the safety of this medicine and check how well it works.    Provide a plan on how to taper (discontinue or go off) using this medicine if the decision is made to stop its use.      ::  As a Patient, I understand controlled substances:     Are prescribed by my care provider to help me function or work and manage my condition(s).?  Are strong medicines and can cause serious side effects.     Need to be taken exactly as prescribed.?Combining controlled substances with certain medicines or chemicals (such as illegal drugs, alcohol, sedatives, sleeping pills, and benzodiazepines) can be dangerous or even fatal.? If I stop taking my medicines suddenly, I may have severe withdrawal symptoms.     The risks, benefits,  and side effects of these medicine(s) were explained to me. I agree that:    I will take part in other treatments as advised by my care team. This may be psychiatry or counseling, physical therapy, behavioral therapy, group treatment or a referral to specialist.    I will keep all my appointments and understand this is part of the monitoring of controlled substances.?My care team may require an office visit for EVERY controlled substance refill. If I miss appointments or don t follow instructions, my care team may stop my medicine    I will take my medicines as prescribed. I will not change the dose or schedule unless my care team tells me to. There will be no refills if I run out early.      I may be asked to come to the clinic and complete a urine drug test or complete a pill count. If I don t give a urine sample or participate in a pill count, the care team may stop my medicine.    I will only receive controlled substance prescriptions from this clinic. If I am treated by another provider, I will tell them that I am taking controlled substances and that I have a treatment agreement with this provider. I will inform my [facility] care team within one business day if I am given a prescription for any controlled substance by another healthcare provider. My [facility] care team can contact other providers and pharmacists about my use of any medicines.    It is up to me to make sure that I don't run out of my medicines on weekends or holidays.?If my care team is willing to refill my prescription without a visit, I must request refills only during office hours. Refills may take up to 3 business days to process. I will use one pharmacy to fill all my controlled substance prescriptions. I will notify the clinic about any changes to my insurance or medicine availability.    I am responsible for my prescriptions. If the medicine/prescription is lost, stolen or destroyed, it will not be replaced.?I also agree  not to share controlled substance medicines with anyone.     I am aware I should not use any illegal or recreational drugs. I agree not to drink alcohol unless my care team says I can.     If I enroll in the [place] Medical Cannabis program, I will tell my care team before my next refill.    I will tell my care team right away if I become pregnant, have a new medical problem treated outside of my regular clinic, or have a change in my medicines.     I understand that this medicine can affect my thinking, judgment and reaction time.? Alcohol and drugs affect the brain and body, which can affect the safety of my driving. Being under the influence of alcohol or drugs can affect my decision-making, behaviors, personal safety and the safety of others. Driving while impaired (DWI) can occur if a person is driving, operating or in physical control of a car, motorcycle, boat, snowmobile, ATV, motorbike, off-road vehicle or any other motor vehicle ([place] Statute 169A.20). I understand the risk if I choose to drive or operate any vehicle or machinery.    I understand that if I do not follow any of the conditions above, my prescriptions or treatment may be stopped or changed.   I agree that my provider, clinic care team and pharmacy may work with any city, state or federal law enforcement agency that investigates the misuse, sale or other diversion of my controlled medicine. I will allow my provider to discuss my care with, or share a copy of, this agreement with any other treating provider, pharmacy or emergency room where I receive care.     I have read this agreement and have asked questions about anything I did not understand.    ________________________________________________________  Patient Signature - Joann Maurer     ___________________                   Date     ________________________________________________________  Provider Signature - Paula Walden, NP       ___________________                   Date      ________________________________________________________  Witness Signature (required if provider not present while patient signing)          ___________________                   Date

## 2023-10-03 ENCOUNTER — OFFICE VISIT (OUTPATIENT)
Dept: FAMILY MEDICINE | Facility: CLINIC | Age: 48
End: 2023-10-03
Payer: COMMERCIAL

## 2023-10-03 VITALS
RESPIRATION RATE: 12 BRPM | OXYGEN SATURATION: 98 % | HEART RATE: 80 BPM | BODY MASS INDEX: 25.04 KG/M2 | HEIGHT: 66 IN | TEMPERATURE: 99.1 F | DIASTOLIC BLOOD PRESSURE: 106 MMHG | WEIGHT: 155.8 LBS | SYSTOLIC BLOOD PRESSURE: 164 MMHG

## 2023-10-03 DIAGNOSIS — Z12.11 SCREEN FOR COLON CANCER: ICD-10-CM

## 2023-10-03 DIAGNOSIS — S09.90XA HEAD TRAUMA, INITIAL ENCOUNTER: Primary | ICD-10-CM

## 2023-10-03 DIAGNOSIS — Z12.4 CERVICAL CANCER SCREENING: ICD-10-CM

## 2023-10-03 DIAGNOSIS — I10 UNCONTROLLED HYPERTENSION: ICD-10-CM

## 2023-10-03 DIAGNOSIS — H05.231 PERIORBITAL HEMATOMA OF RIGHT EYE: ICD-10-CM

## 2023-10-03 LAB
ALBUMIN UR-MCNC: NEGATIVE MG/DL
ANION GAP SERPL CALCULATED.3IONS-SCNC: 11 MMOL/L (ref 7–15)
APPEARANCE UR: CLEAR
BILIRUB UR QL STRIP: NEGATIVE
BUN SERPL-MCNC: 7.3 MG/DL (ref 6–20)
CALCIUM SERPL-MCNC: 9.2 MG/DL (ref 8.6–10)
CHLORIDE SERPL-SCNC: 101 MMOL/L (ref 98–107)
COLOR UR AUTO: YELLOW
CREAT SERPL-MCNC: 0.8 MG/DL (ref 0.51–0.95)
DEPRECATED HCO3 PLAS-SCNC: 28 MMOL/L (ref 22–29)
EGFRCR SERPLBLD CKD-EPI 2021: 89 ML/MIN/1.73M2
ERYTHROCYTE [DISTWIDTH] IN BLOOD BY AUTOMATED COUNT: 11.6 % (ref 10–15)
GLUCOSE SERPL-MCNC: 96 MG/DL (ref 70–99)
GLUCOSE UR STRIP-MCNC: NEGATIVE MG/DL
HCT VFR BLD AUTO: 44.4 % (ref 35–47)
HGB BLD-MCNC: 14.8 G/DL (ref 11.7–15.7)
HGB UR QL STRIP: NEGATIVE
KETONES UR STRIP-MCNC: NEGATIVE MG/DL
LEUKOCYTE ESTERASE UR QL STRIP: NEGATIVE
MCH RBC QN AUTO: 31 PG (ref 26.5–33)
MCHC RBC AUTO-ENTMCNC: 33.3 G/DL (ref 31.5–36.5)
MCV RBC AUTO: 93 FL (ref 78–100)
NITRATE UR QL: NEGATIVE
PH UR STRIP: 6 [PH] (ref 5–7)
PLATELET # BLD AUTO: 306 10E3/UL (ref 150–450)
POTASSIUM SERPL-SCNC: 3.9 MMOL/L (ref 3.4–5.3)
RBC # BLD AUTO: 4.77 10E6/UL (ref 3.8–5.2)
SODIUM SERPL-SCNC: 140 MMOL/L (ref 135–145)
SP GR UR STRIP: <=1.005 (ref 1–1.03)
TSH SERPL DL<=0.005 MIU/L-ACNC: 1.02 UIU/ML (ref 0.3–4.2)
UROBILINOGEN UR STRIP-ACNC: 0.2 E.U./DL
WBC # BLD AUTO: 6 10E3/UL (ref 4–11)

## 2023-10-03 PROCEDURE — 81003 URINALYSIS AUTO W/O SCOPE: CPT | Performed by: FAMILY MEDICINE

## 2023-10-03 PROCEDURE — 85027 COMPLETE CBC AUTOMATED: CPT | Performed by: FAMILY MEDICINE

## 2023-10-03 PROCEDURE — 84443 ASSAY THYROID STIM HORMONE: CPT | Performed by: FAMILY MEDICINE

## 2023-10-03 PROCEDURE — 36415 COLL VENOUS BLD VENIPUNCTURE: CPT | Performed by: FAMILY MEDICINE

## 2023-10-03 PROCEDURE — 80048 BASIC METABOLIC PNL TOTAL CA: CPT | Performed by: FAMILY MEDICINE

## 2023-10-03 PROCEDURE — 99214 OFFICE O/P EST MOD 30 MIN: CPT | Mod: 25 | Performed by: FAMILY MEDICINE

## 2023-10-03 PROCEDURE — 93000 ELECTROCARDIOGRAM COMPLETE: CPT | Performed by: FAMILY MEDICINE

## 2023-10-03 RX ORDER — LOSARTAN POTASSIUM 25 MG/1
25 TABLET ORAL DAILY
Qty: 90 TABLET | Refills: 0 | Status: SHIPPED | OUTPATIENT
Start: 2023-10-03 | End: 2023-10-21

## 2023-10-03 ASSESSMENT — PAIN SCALES - GENERAL: PAINLEVEL: NO PAIN (0)

## 2023-10-03 NOTE — PATIENT INSTRUCTIONS
After our discussion today, you preferred to observe symptoms for another day or two. If you develop increasing headache, dizziness, nausea, vomiting, or unsteadiness, schedule the CT scan. To schedule the CT scan, call [phone].   Go to ER if with confusion, drowsiness or convulsions, you should be brought to the ER promptly.    Your blood pressure today is uncontrolled.  Due to this medical treatment should be started to prevent complications.  Start losartan 25 mg daily  Blood, urine, chest xray and EKG tests are ordered as baseline.  Low salt, low fat diet. Exercise as tolerated 30 mins per day 3 days a week.  Take meds as prescribed; call if with side effects.   Follow up in clinic in 1 week for BP recheck with the nurse.    See Paula Walden as scheduled later this October.     You will be contacted in 1-2 days for results of your lab tests.     Schedule colonoscopy. You already have an order for this.

## 2023-10-03 NOTE — PROGRESS NOTES
Assessment & Plan     Head trauma, initial encounter  No abnormal neurologic sign. Patient does have moderate sized hematoma on right forehead causing right periorbital hematma (dependent likely).  Discussed pros and cons of CT imaging at this time.  Since no acute signs or symptoms, patient preferred expectant management and defer CT scanning.  Patient was advied reasons to schedule the imaging.,  Reasons to go to ER discussed in detail with patient.   - CT Head w/o Contrast    Periorbital hematoma of right eye  Likely dependent. No tenderness around orbital bones.  Continue ice compress and observe for development of other symptoms.  Return precautions discussed and given to patient.  - CT Head w/o Contrast    Uncontrolled hypertension  Discussed causes, course, complications and treatment of condition.  Low salt, low fat diet.   Exercise as tolerated 30 mins per day 3 days a week to start with.  Counseled on different meds; patient agreed to above recommended med.  Take meds as prescribed; call if with side effects.   Nurse visit in 1 weeks for recheck.  Return precautions given.   - Basic metabolic panel  - TSH with free T4 reflex  - CBC with platelets  - EKG 12-lead complete w/read - Clinics  - Urine Macroscopic with reflex to Microscopic  - losartan (COZAAR) 25 MG tablet  Dispense: 90 tablet; Refill: 0  - Urine Macroscopic with reflex to Microscopic  - CBC with platelets  - TSH with free T4 reflex  - Basic metabolic panel    Screen for colon cancer  Has order already placed by another provider. Patient was reminded to schedule.    Cervical cancer screening  Patient will address at another visit    Patient Instructions   After our discussion today, you preferred to observe symptoms for another day or two. If you develop increasing headache, dizziness, nausea, vomiting, or unsteadiness, schedule the CT scan. To schedule the CT scan, call [phone].   Go to ER if with confusion, drowsiness or convulsions,  you should be brought to the ER promptly.    Your blood pressure today is uncontrolled.  Due to this medical treatment should be started to prevent complications.  Start losartan 25 mg daily  Blood, urine, chest xray and EKG tests are ordered as baseline.  Low salt, low fat diet. Exercise as tolerated 30 mins per day 3 days a week.  Take meds as prescribed; call if with side effects.   Follow up in clinic in 1 week for BP recheck with the nurse.    See Paula Walden as scheduled later this October.     You will be contacted in 1-2 days for results of your lab tests.     Shaka Martin MD  [facility]    Sage David is a 48 year old, presenting for the following health issues:  Trauma (Fell last Saturday, hitting forehead right side.  Eye swollen, bruised.  Swelling has decreased, still has bump on forehead that she would liked checked.  No headache, nausea or vomiting.  ) and Hypertension (Was seen 9/28/23 to discuss ADHD meds, bp was elevated, she is monitoring at home, continues to get some elevated readings.)        10/3/2023     8:02 AM   Additional Questions   Roomed by Flor REYES   Accompanied by self         10/3/2023     8:02 AM   Patient Reported Additional Medications   Patient reports taking the following new medications none       Trauma  This is a new problem. The current episode started in the past 7 days. The problem has been gradually improving. Associated symptoms comments: Still sore on right forehead; still with bruise around right eye; swelling has improved a lot but has a lump on the right forehead.. Nothing aggravates the symptoms. She has tried NSAIDs, acetaminophen and ice for the symptoms. The treatment provided moderate relief.          Chief Complaint   Patient presents with    Trauma     Fell last Saturday, hitting forehead right side.  Eye swollen, bruised.  Swelling has decreased, still has bump on forehead that she would liked checked.  No headache,  "nausea or vomiting.      Hypertension     Was seen 9/28/23 to discuss ADHD meds, bp was elevated, she is monitoring at home, continues to get some elevated readings.       Hypertension Follow-up    Do you check your blood pressure regularly outside of the clinic? Yes   Are you following a low salt diet? No  Are your blood pressures ever more than 140 on the top number (systolic) OR more   than 90 on the bottom number (diastolic), for example 140/90? Yes  Mother and sister are on antihypertensives.  Has tried to quit tobacco chew - now currently on nicotine patch.  Denies symptoms of HBP.    Review of Systems   Constitutional, HEENT, cardiovascular, pulmonary, GI, , musculoskeletal, neuro, skin, endocrine and psych systems are negative, except as otherwise noted.      Objective    BP (!) 164/106 (BP Location: Right arm, Patient Position: Chair, Cuff Size: Adult Regular)   Pulse 80   Temp 99.1  F (37.3  C) (Tympanic)   Resp 12   Ht 1.664 m (5' 5.5\")   Wt 70.7 kg (155 lb 12.8 oz)   LMP  (LMP Unknown)   SpO2 98%   BMI 25.53 kg/m    Body mass index is 25.53 kg/m .  Physical Exam   GENERAL:  alert, oriented x 3, and no distress, ambulatory w/o assist  HEAD: moderate sized on-tender right frontotemporal hematoma; no periorbital tenderness  EYES: EOMI with no pain; PEBRTL  ENT: right EAM with moderate non-impacted cerumen; left EAM clear with visible tympanic membrane with no hemoptympanum; nose clear and no TTP.  NECK: no tenderness, no adenopathy, FROM with no pain  RESP: lungs clear to auscultation - no rales, no rhonchi, no wheezes  CV: regular rates and rhythm, no murmur  MS: no edema  SKIN: right periorbital hematoma/ecchymosis with no swelling or tenderness  NEURO: Patient is A and O X 3; Cranial nerves 2-12 intact;  Strength 5/5 all extremities; DTR: ++ x 4;  Sensory grosly intact; no tremors No problems with motor coordination       Office Visit on 09/28/2023   Component Date Value Ref Range Status    " Cannabinoids (76-[id number]-9-carboxy-9-T* 09/28/2023 Not Detected  Not Detected, Indeterminate Final    Cutoff for a negative cannabinoid is 50 ng/mL or less.    Phencyclidine 09/28/2023 Not Detected  Not Detected, Indeterminate Final    Cutoff for a negative PCP is 25 ng/mL or less.    Cocaine (Benzoylecgonine) 09/28/2023 Not Detected  Not Detected, Indeterminate Final    Cutoff for a negative cocaine is 150 ng/ml or less.    Methamphetamine (d-Methamphetamine) 09/28/2023 Not Detected  Not Detected, Indeterminate Final    Cutoff for a negative methamphetamine is 500 ng/ml or less.    Opiates (Morphine) 09/28/2023 Not Detected  Not Detected, Indeterminate Final    Cutoff for a negative opiate is 100 ng/ml or less.    Amphetamine (d-Amphetamine) 09/28/2023 Not Detected  Not Detected, Indeterminate Final    Cutoff for a negative amphetamine is 500 ng/mL or less.    Benzodiazepines (Nordiazepam) 09/28/2023 Not Detected  Not Detected, Indeterminate Final    Cutoff for a negative benzodiazepine is 150 ng/ml or less.    Tricyclic Antidepressants (Desipra* 09/28/2023 Not Detected  Not Detected, Indeterminate Final    Cutoff for a negative tricyclic antidepressant is 300 ng/ml or less.    Methadone 09/28/2023 Not Detected  Not Detected, Indeterminate Final    Cutoff for a negative methadone is 200 ng/ml or less.    Barbiturates (Butalbital) 09/28/2023 Not Detected  Not Detected, Indeterminate Final    Cutoff for a negative barbituate is 200 ng/ml or less.    Oxycodone 09/28/2023 Not Detected  Not Detected, Indeterminate Final    Cutoff for a negative oxycodone is 100 ng/mL or less.    Buprenorphine 09/28/2023 Not Detected  Not Detected, Indeterminate Final    Cutoff for a negative buprenorphine is 10 ng/ml or less.

## 2023-10-03 NOTE — Clinical Note
Joann is starting losartan for hypertension. She will see you later in October 2023. Did not have enough time to address her cervical cancer screening so you may want to offer her or schedule her for that when she sees you.

## 2023-10-04 ENCOUNTER — TELEPHONE (OUTPATIENT)
Dept: FAMILY MEDICINE | Facility: CLINIC | Age: 48
End: 2023-10-04
Payer: COMMERCIAL

## 2023-10-04 NOTE — LETTER
October 4, 2023    To  Joann Maurer  [id number] [facility] [address]    Your team at [facility] cares about your health. We have reviewed your chart and based on our findings; we are making the following recommendations to better manage your health.     You are in particular need of attention regarding the following:     Schedule a primary care office visit with your provider for a Pap Smear to screen for Cervical Cancer.  Call or MyChart message your clinic to schedule a colonoscopy, schedule/ a FIT Test, or order a Cologuard test. If you are unsure what type of test you need, please call your clinic and speak to clinic staff.   Colon cancer is now the second leading cause of cancer-related deaths in the [place] for both men and women and there are over 130,000 new cases and 50,000 deaths per year from colon cancer. Colonoscopies can prevent 90-95% of these deaths. Problem lesions can be removed before they ever become cancer. This test is not only looking for cancer, but also getting rid of precancerous lesions.   If you are under/uninsured, we recommend you contact the [employer] Program.[employer] is a free colorectal cancer screening program that provides colonoscopies for eligible under/uninsured [place] men and women. If you are interested in receiving a free colonoscopy, please call [employer] at t [phone] (mention code [employer]) to see if you're eligible. Please have them send us the results.   PREVENTATIVE VISIT: Physical    If you have already completed these items, please contact the clinic via phone or   [employer] so your care team can review and update your records. Thank you for   choosing [facility] for your healthcare needs. For any questions,   concerns, or to schedule an appointment please contact our clinic.    Healthy Regards,    Your [facility] Care Team

## 2023-10-04 NOTE — TELEPHONE ENCOUNTER
Patient Quality Outreach    Patient is due for the following:   Colon Cancer Screening  Cervical Cancer Screening - PAP Needed  Physical Preventive Adult Physical    Next Steps:   Schedule a Adult Preventative    Type of outreach:    Sent letter.    Next Steps:  Reach out within 90 days via Letter.    Max number of attempts reached: No. Will try again in 90 days if patient still on fail list.    Questions for provider review:    None           Ara Oh, [facility]  Chart routed to none, letter sent.

## 2023-10-06 ENCOUNTER — HOSPITAL ENCOUNTER (OUTPATIENT)
Dept: CT IMAGING | Facility: CLINIC | Age: 48
Discharge: HOME | End: 2023-10-06
Attending: FAMILY MEDICINE | Admitting: FAMILY MEDICINE
Payer: COMMERCIAL

## 2023-10-06 DIAGNOSIS — H05.231 PERIORBITAL HEMATOMA OF RIGHT EYE: ICD-10-CM

## 2023-10-06 DIAGNOSIS — S09.90XA HEAD TRAUMA, INITIAL ENCOUNTER: ICD-10-CM

## 2023-10-06 PROCEDURE — 70450 CT HEAD/BRAIN W/O DYE: CPT

## 2023-10-19 ENCOUNTER — TELEPHONE (OUTPATIENT)
Dept: FAMILY MEDICINE | Facility: CLINIC | Age: 48
End: 2023-10-19

## 2023-10-19 ENCOUNTER — OFFICE VISIT (OUTPATIENT)
Dept: OBGYN | Facility: CLINIC | Age: 48
End: 2023-10-19
Payer: COMMERCIAL

## 2023-10-19 VITALS
WEIGHT: 157.6 LBS | RESPIRATION RATE: 16 BRPM | DIASTOLIC BLOOD PRESSURE: 95 MMHG | HEART RATE: 81 BPM | BODY MASS INDEX: 26.26 KG/M2 | SYSTOLIC BLOOD PRESSURE: 127 MMHG | HEIGHT: 65 IN | TEMPERATURE: 98.4 F

## 2023-10-19 DIAGNOSIS — Z00.00 ANNUAL PHYSICAL EXAM: Primary | ICD-10-CM

## 2023-10-19 PROCEDURE — 87624 HPV HI-RISK TYP POOLED RSLT: CPT | Performed by: ADVANCED PRACTICE MIDWIFE

## 2023-10-19 PROCEDURE — G0145 SCR C/V CYTO,THINLAYER,RESCR: HCPCS | Performed by: ADVANCED PRACTICE MIDWIFE

## 2023-10-19 PROCEDURE — 99386 PREV VISIT NEW AGE 40-64: CPT | Performed by: ADVANCED PRACTICE MIDWIFE

## 2023-10-19 ASSESSMENT — ENCOUNTER SYMPTOMS
CHILLS: 0
DYSURIA: 0
HEARTBURN: 0
HEMATURIA: 0
ABDOMINAL PAIN: 0
SHORTNESS OF BREATH: 0
HEADACHES: 0
HEMATOCHEZIA: 0
JOINT SWELLING: 0
PALPITATIONS: 0
DIZZINESS: 0
FEVER: 0
CONSTIPATION: 0
ARTHRALGIAS: 0
SORE THROAT: 0
WEAKNESS: 0
EYE PAIN: 0
BREAST MASS: 0
FREQUENCY: 0
COUGH: 0
MYALGIAS: 0
NAUSEA: 0
DIARRHEA: 0
PARESTHESIAS: 0
NERVOUS/ANXIOUS: 0

## 2023-10-19 NOTE — TELEPHONE ENCOUNTER
Patient calling. Had a visit with OB/GYN this morning and BP was 127/95. Patient states she has been taking Losartan for 1 week now. Wondering if its okay for her to start taking Adderall or if she needs to wait for her BP to lower more?       Could we send this information to you in [employer] or would you prefer to receive a phone call?:   Patient would prefer a phone call   Okay to leave a detailed message?: Yes at Home number on file [phone] (home)

## 2023-10-19 NOTE — PROGRESS NOTES
SUBJECTIVE:   CC: Joann Maurer is an 48 year old woman who presents for preventive health visit. She just started blood pressure medication.  She has a colonoscopy and mammogram ordered through her PCP.      Patient has been advised of split billing requirements and indicates understanding: Yes  Answers submitted by the patient for this visit:  Annual Preventive Visit (Submitted on 10/19/2023)  Chief Complaint: Annual Exam:  Frequency of exercise:: 2-3 days/week  Getting at least 3 servings of Calcium per day:: Yes  Diet:: Low salt  Taking medications regularly:: Yes  Medication side effects:: None  Bi-annual eye exam:: Yes  Dental care twice a year:: Yes  Sleep apnea or symptoms of sleep apnea:: None  abdominal pain: No  Blood in stool: No  Blood in urine: No  chest pain: No  chills: No  congestion: No  constipation: No  cough: No  diarrhea: No  dizziness: No  ear pain: No  eye pain: No  nervous/anxious: No  fever: No  frequency: No  genital sores: No  headaches: No  hearing loss: No  heartburn: No  arthralgias: No  joint swelling: No  peripheral edema: No  mood changes: No  myalgias: No  nausea: No  dysuria: No  palpitations: No  Skin sensation changes: No  sore throat: No  urgency: No  rash: No  shortness of breath: No  visual disturbance: No  weakness: No  pelvic pain: No  vaginal bleeding: No  vaginal discharge: No  tenderness: No  breast mass: No  breast discharge: No  Additional concerns today:: No  Exercise outside of work (Submitted on 10/19/2023)  Chief Complaint: Annual Exam:  Duration of exercise:: 30-45 minutes    Today's PHQ-2 Score:       10/19/2023    11:06 AM 8/2/2023     8:19 AM   PHQ-2 ( 1999 [employer])   Q1: Little interest or pleasure in doing things 0 0   Q2: Feeling down, depressed or hopeless 0 1   PHQ-2 Score 0 1   Q1: Little interest or pleasure in doing things Not at all Not at all   Q2: Feeling down, depressed or hopeless Not at all Several days   PHQ-2 Score 0 1       Abuse: Current or  "Past(Physical, Sexual or Emotional)- No  Do you feel safe in your environment? Yes    Have you ever done Advance Care Planning? (For example, a Health Directive, POLST, or a discussion with a medical provider or your loved ones about your wishes): No, advance care planning information given to patient to review.  Patient plans to discuss their wishes with loved ones or provider.      Social History     Tobacco Use    Smoking status: Never    Smokeless tobacco: Current    Tobacco comments:     \"On\" nicotine pouches   Substance Use Topics    Alcohol use: Yes     Comment: socially     If you drink alcohol do you typically have >3 drinks per day or >7 drinks per week? No                     Reviewed orders with patient.  Reviewed health maintenance and updated orders accordingly - Yes  Labs reviewed in [facility]:       8/25/2021     3:26 PM 8/26/2022     9:43 AM   Breast CA Risk Assessment (FHS-7)   Did any of your first-degree relatives have breast or ovarian cancer? No No   Did any of your relatives have bilateral breast cancer? No No   Did any man in your family have breast cancer? No No   Did any woman in your family have breast and ovarian cancer? No No   Did any woman in your family have breast cancer before age 50 y? No No   Do you have 2 or more relatives with breast and/or ovarian cancer? No Yes   Do you have 2 or more relatives with breast and/or bowel cancer? No No       Mammogram Screening: Recommended annual mammography  Pertinent mammograms are reviewed under the imaging tab.    Pertinent mammograms are reviewed under the imaging tab.  History of abnormal Pap smear: NO - age 30-65 PAP every 5 years with negative HPV co-testing recommended      Latest Ref Rng & Units 1/18/2018    11:45 AM 1/18/2018    10:50 AM 6/4/2015    11:54 AM   PAP / HPV   PAP (Historical)  NIL      HPV 16 DNA NEG^Negative  Negative  Negative    HPV 18 DNA NEG^Negative  Negative  Negative    Other HR HPV NEG^Negative  Negative  " Negative      Reviewed and updated as needed this visit by clinical staff                  Reviewed and updated as needed this visit by Provider                 Past Medical History:   Diagnosis Date    Anxiety     Depression     Exercise-induced asthma         ROS:  CONSTITUTIONAL: NEGATIVE for fever, chills, change in weight  INTEGUMENTARY/SKIN: NEGATIVE for worrisome rashes, moles or lesions  EYES: NEGATIVE for vision changes or irritation  ENT: NEGATIVE for ear, mouth and throat problems  RESP: NEGATIVE for significant cough or SOB  BREAST: NEGATIVE for masses, tenderness or discharge  CV: NEGATIVE for chest pain, palpitations or peripheral edema  GI: NEGATIVE for nausea, abdominal pain, heartburn, or change in bowel habits  : NEGATIVE for unusual urinary or vaginal symptoms. No vaginal bleeding.  MUSCULOSKELETAL: NEGATIVE for significant arthralgias or myalgia  NEURO: NEGATIVE for weakness, dizziness or paresthesias  PSYCHIATRIC: NEGATIVE for changes in mood or affect     OBJECTIVE:   LMP  (LMP Unknown)   EXAM:  GENERAL: healthy, alert and no distress  EYES: Eyes grossly normal to inspection, PERRL and conjunctivae and sclerae normal  HENT: ear canals and TM's normal, nose and mouth without ulcers or lesions  NECK: no adenopathy, no asymmetry, masses, or scars and thyroid normal to palpation  RESP: lungs clear to auscultation - no rales, rhonchi or wheezes  BREAST: normal without masses, tenderness or nipple discharge and no palpable axillary masses or adenopathy  CV: regular rate and rhythm, normal S1 S2, no S3 or S4, no murmur, click or rub, no peripheral edema and peripheral pulses strong  ABDOMEN: soft, nontender, no hepatosplenomegaly, no masses and bowel sounds normal   (female): normal female external genitalia, normal urethral meatus, vaginal mucosa pink, moist, well rugated, and normal cervix/adnexa/uterus without masses or discharge  Speculum placed and pap collected   MS: no gross musculoskeletal  "defects noted, no edema  SKIN: no suspicious lesions or rashes  NEURO: Normal strength and tone, mentation intact and speech normal  PSYCH: mentation appears normal, affect normal/bright    Labs reviewed in Epic    ASSESSMENT/PLAN:       ICD-10-CM    1. Annual physical exam  Z00.00           Patient has been advised of split billing requirements and indicates understanding: Yes  COUNSELING:   Reviewed preventive health counseling, as reflected in patient instructions  Special attention given to:        Regular exercise       Healthy diet/nutrition       ([place])menopause management    Estimated body mass index is 25.53 kg/m  as calculated from the following:    Height as of 10/3/23: 1.664 m (5' 5.5\").    Weight as of 10/3/23: 70.7 kg (155 lb 12.8 oz).    Weight management plan: Discussed healthy diet and exercise guidelines    She reports that she has never smoked. She uses smokeless tobacco.      Counseling Resources:  ATP IV Guidelines  Pooled Cohorts Equation Calculator  Breast Cancer Risk Calculator  BRCA-Related Cancer Risk Assessment: FHS-7 Tool  FRAX Risk Assessment  ICSI Preventive Guidelines  Dietary Guidelines for Americans, 2010  USDA's MyPlate  ASA Prophylaxis  Lung CA Screening    Carol Brock CNM  [facility]  "

## 2023-10-20 ENCOUNTER — TELEPHONE (OUTPATIENT)
Dept: FAMILY MEDICINE | Facility: CLINIC | Age: 48
End: 2023-10-20

## 2023-10-20 ENCOUNTER — ALLIED HEALTH/NURSE VISIT (OUTPATIENT)
Dept: FAMILY MEDICINE | Facility: CLINIC | Age: 48
End: 2023-10-20
Payer: COMMERCIAL

## 2023-10-20 VITALS
DIASTOLIC BLOOD PRESSURE: 90 MMHG | HEART RATE: 84 BPM | OXYGEN SATURATION: 99 % | RESPIRATION RATE: 16 BRPM | SYSTOLIC BLOOD PRESSURE: 132 MMHG

## 2023-10-20 DIAGNOSIS — I10 UNCONTROLLED HYPERTENSION: Primary | ICD-10-CM

## 2023-10-20 DIAGNOSIS — I10 UNCONTROLLED HYPERTENSION: ICD-10-CM

## 2023-10-20 PROCEDURE — 99207 PR NO CHARGE NURSE ONLY: CPT

## 2023-10-20 NOTE — TELEPHONE ENCOUNTER
Please contact patient and let her know that she can start the medication and follow-up with nursing appointment in 1 week for recheck if elevated would discontinue and increase losartan to 50 mg to see if BP responds better and then attempt to restart again.  With increase in dose she would need to follow-up with BMP lab in 1 month for recheck in clinic.  Paula Walden NP on 10/20/2023 at 4:18 PM

## 2023-10-20 NOTE — PROGRESS NOTES
Joann Maurer is a 48 year old year old patient who comes in today for a Blood Pressure check because of new medication.    OV 10/3: pt started on losartan 25mg daily.   Pt had appt yesterday in OB/GYN & BP was 127/95 p81  Pt has upcoming appt with IZA Walden on 11/2      Vital Signs as repeated by RN   138/90 p84  132/90 p84    Patient is taking medication as prescribed  Patient is tolerating medications well.  Patient is not monitoring Blood Pressure at home.      Current complaints: none  Disposition:  routed to provider for review.    Analia Knott RN

## 2023-10-20 NOTE — TELEPHONE ENCOUNTER
Joann Mcknight is a 48 year old year old patient who comes in today for a Blood Pressure check because of new medication.     OV 10/3: pt started on losartan 25mg daily.   Pt had appt yesterday in OB/GYN & BP was 127/95 p81  Pt has upcoming appt with IZA Walden on 11/2        Vital Signs as repeated by RN   138/90 p84  132/90 p84     Patient is taking medication as prescribed  Patient is tolerating medications well.  Patient is not monitoring Blood Pressure at home.      Current complaints: none  Disposition:  routed to provider for review.     Analia Knott RN

## 2023-10-21 RX ORDER — LOSARTAN POTASSIUM 25 MG/1
50 TABLET ORAL DAILY
Qty: 90 TABLET | Refills: 0 | Status: SHIPPED | OUTPATIENT
Start: 2023-10-21 | End: 2023-11-13

## 2023-10-21 NOTE — TELEPHONE ENCOUNTER
Increase losartan 25 mg to two tablets per day.  Be consistent with sodium restrictions.  Recheck BP at the scheduled appointment on 11/2.

## 2023-10-23 LAB
BKR LAB AP GYN ADEQUACY: NORMAL
BKR LAB AP GYN INTERPRETATION: NORMAL
BKR LAB AP HPV REFLEX: NORMAL
BKR LAB AP PREVIOUS ABNORMAL: NORMAL
PATH REPORT.COMMENTS IMP SPEC: NORMAL
PATH REPORT.COMMENTS IMP SPEC: NORMAL
PATH REPORT.RELEVANT HX SPEC: NORMAL

## 2023-10-24 ENCOUNTER — NURSE TRIAGE (OUTPATIENT)
Dept: NURSING | Facility: CLINIC | Age: 48
End: 2023-10-24
Payer: COMMERCIAL

## 2023-10-24 ENCOUNTER — TELEPHONE (OUTPATIENT)
Dept: FAMILY MEDICINE | Facility: CLINIC | Age: 48
End: 2023-10-24
Payer: COMMERCIAL

## 2023-10-24 LAB
HUMAN PAPILLOMA VIRUS 16 DNA: NEGATIVE
HUMAN PAPILLOMA VIRUS 18 DNA: NEGATIVE
HUMAN PAPILLOMA VIRUS FINAL DIAGNOSIS: NORMAL
HUMAN PAPILLOMA VIRUS OTHER HR: NEGATIVE

## 2023-10-24 NOTE — TELEPHONE ENCOUNTER
Paula,    I had already called and given the patient your message on blood pressure meds.  Is it ok to disregard this message below? Please advise. Renita COUCH RN

## 2023-10-24 NOTE — TELEPHONE ENCOUNTER
"\"Someone left me a message to call back to RN. I was told to  a RX. Follow up visit next Thursday 11/2 to check blood pressure. I went to  RX and they couldn't fill it.  ? because of insurance: prior authorization needed for Adderall. I have not been on it before. Ordered on  9/28/2023: It was never picked up.     Message will be forwarded to Paula Walden NP--- can you assist patient with this prescription ? Thank you.      Ana Padgett RN Triage Nurse Advisor 6:59 PM 10/24/2023  Reason for Disposition   [1] Caller has NON-URGENT medicine question about med that PCP prescribed AND [2] triager unable to answer question    Additional Information   Negative: [1] Intentional drug overdose AND [2] suicidal thoughts or ideas   Negative: Drug overdose and triager unable to answer question   Negative: Caller requesting a renewal or refill of a medicine patient is currently taking   Negative: Caller requesting information unrelated to medicine   Negative: Caller requesting information about COVID-19 Vaccine   Negative: Caller requesting information about Emergency Contraception   Negative: Caller requesting information about Combined Birth Control Pills   Negative: Caller requesting information about Progestin Birth Control Pills   Negative: Caller requesting information about Post-Op pain or medicines   Negative: Caller requesting a prescription antibiotic (such as Penicillin) for Strep throat and has a positive culture result   Negative: Caller requesting a prescription anti-viral med (such as Tamiflu) and has influenza (flu) symptoms   Negative: Immunization reaction suspected   Negative: Rash while taking a medicine or within 3 days of stopping it   Negative: [1] Asthma and [2] having symptoms of asthma (cough, wheezing, etc.)   Negative: [1] Symptom of illness (e.g., headache, abdominal pain, earache, vomiting) AND [2] more than mild   Negative: Breastfeeding questions about mother's medicines and " diet   Negative: MORE THAN A DOUBLE DOSE of a prescription or over-the-counter (OTC) drug   Negative: [1] DOUBLE DOSE (an extra dose or lesser amount) of prescription drug AND [2] any symptoms (e.g., dizziness, nausea, pain, sleepiness)   Negative: [1] DOUBLE DOSE (an extra dose or lesser amount) of over-the-counter (OTC) drug AND [2] any symptoms (e.g., dizziness, nausea, pain, sleepiness)   Negative: Took another person's prescription drug   Negative: [1] DOUBLE DOSE (an extra dose or lesser amount) of prescription drug AND [2] NO symptoms  (Exception: A double dose of antibiotics.)   Negative: Diabetes drug error or overdose (e.g., took wrong type of insulin or took extra dose)   Negative: [1] Prescription not at pharmacy AND [2] was prescribed by PCP recently (Exception: Triager has access to EMR and prescription is recorded there. Go to Home Care and confirm for pharmacy.)   Negative: [1] Pharmacy calling with prescription question AND [2] triager unable to answer question   Negative: [1] Caller has URGENT medicine question about med that PCP or specialist prescribed AND [2] triager unable to answer question   Negative: Medicine patch causing local rash or itching   Negative: [1] Caller has medicine question about med NOT prescribed by PCP AND [2] triager unable to answer question (e.g., compatibility with other med, storage)   Negative: Prescription request for new medicine (not a refill)    Protocols used: [facility]

## 2023-10-25 NOTE — TELEPHONE ENCOUNTER
Prior Authorization Retail Medication Request    Medication/Dose: Amphetamine-dextroamphetamine  ICD code (if different than what is on RX):    Previously Tried and Failed:    Rationale:  new prescription    Insurance Name:  not provided  Insurance ID:  not provided  [facility]; [id number]      Pharmacy Information (if different than what is on RX)  Name:    Phone:

## 2023-10-26 ENCOUNTER — MYC MEDICAL ADVICE (OUTPATIENT)
Dept: FAMILY MEDICINE | Facility: CLINIC | Age: 48
End: 2023-10-26
Payer: COMMERCIAL

## 2023-10-26 NOTE — TELEPHONE ENCOUNTER
Prior Authorization Not Needed per Insurance    Medication: AMPHETAMINE-DEXTROAMPHET ER 10 MG PO CP24  Insurance Company: Other (see comments)  Expected CoPay: $    Pharmacy Filling the Rx: [id number] - [address]  Pharmacy Notified: Yes  Patient Notified: No    Called insurance ([employer]) to start PA. Per rep, brand is preferred. I called pharmacy to change to brand and reprocess. Tech tried it but was not going through. I told the tech that the ID# they had on the CMM request was not found when I provided it to the insurance rep. The ID# they have is [id number]. I gave that ID to pharmacy and the tech stated she will fix it and reprocess the brand. Patient will be notified when ready for pick. I also told the tech that insurance stated if they are having issues processing, to call [phone] but they ran a test claim a got a paid claim. Tech stated she will do that.

## 2023-10-27 ENCOUNTER — HOSPITAL ENCOUNTER (OUTPATIENT)
Dept: MAMMOGRAPHY | Facility: CLINIC | Age: 48
Discharge: HOME | End: 2023-10-27
Admitting: NURSE PRACTITIONER
Payer: COMMERCIAL

## 2023-10-27 DIAGNOSIS — Z12.31 VISIT FOR SCREENING MAMMOGRAM: ICD-10-CM

## 2023-10-27 PROCEDURE — 77067 SCR MAMMO BI INCL CAD: CPT

## 2023-10-29 NOTE — TELEPHONE ENCOUNTER
Called [phone] and verified patient's diagnosis code (which is what was needed for approval) and medication was approved 10/29/23-10/28/2024. Patient notified via My Chart.

## 2023-10-30 ENCOUNTER — TELEPHONE (OUTPATIENT)
Dept: FAMILY MEDICINE | Facility: CLINIC | Age: 48
End: 2023-10-30

## 2023-10-30 DIAGNOSIS — F90.2 ADHD (ATTENTION DEFICIT HYPERACTIVITY DISORDER), COMBINED TYPE: ICD-10-CM

## 2023-10-30 RX ORDER — DEXTROAMPHETAMINE SACCHARATE, AMPHETAMINE ASPARTATE MONOHYDRATE, DEXTROAMPHETAMINE SULFATE AND AMPHETAMINE SULFATE 2.5; 2.5; 2.5; 2.5 MG/1; MG/1; MG/1; MG/1
10 CAPSULE, EXTENDED RELEASE ORAL DAILY
Qty: 30 CAPSULE | Refills: 0 | OUTPATIENT
Start: 2023-10-30

## 2023-10-30 NOTE — TELEPHONE ENCOUNTER
Pt called and stated pharmacy can not process amphetamine-dextroamphetamine (ADDERALL XR) 10 MG 24 hr capsule. Should pt cancel follow up appt 11/2?

## 2023-10-30 NOTE — TELEPHONE ENCOUNTER
Pt requesting refill on ADHD medication. Pt has med follow up 11/2. Order pended, will route to PCP to advise.    Maryuri Ordoñez RN

## 2023-11-02 NOTE — TELEPHONE ENCOUNTER
Routed to provider.  Please see [employer] message from pt.  Pt got Adderall on 10/31 and is trying to make appt to be seen within 30 days of starting meds but next available is 12/11.    Ok to use same day?    Sandi Quintero RN

## 2023-11-03 NOTE — TELEPHONE ENCOUNTER
Left message to call clinic back.  If patient calls back please help her schedule an appointment in [facility] Same day slots.    Gaye Ang PSC on 11/3/2023 at 10:43 AM

## 2023-11-13 ENCOUNTER — VIRTUAL VISIT (OUTPATIENT)
Dept: FAMILY MEDICINE | Facility: CLINIC | Age: 48
End: 2023-11-13
Payer: COMMERCIAL

## 2023-11-13 DIAGNOSIS — I10 UNCONTROLLED HYPERTENSION: ICD-10-CM

## 2023-11-13 DIAGNOSIS — F90.2 ADHD (ATTENTION DEFICIT HYPERACTIVITY DISORDER), COMBINED TYPE: Primary | ICD-10-CM

## 2023-11-13 PROCEDURE — 99214 OFFICE O/P EST MOD 30 MIN: CPT | Mod: VID | Performed by: NURSE PRACTITIONER

## 2023-11-13 RX ORDER — LOSARTAN POTASSIUM 25 MG/1
25 TABLET ORAL DAILY
COMMUNITY
Start: 2023-11-13 | End: 2023-12-11 | Stop reason: DRUGHIGH

## 2023-11-13 RX ORDER — DEXTROAMPHETAMINE SACCHARATE, AMPHETAMINE ASPARTATE MONOHYDRATE, DEXTROAMPHETAMINE SULFATE AND AMPHETAMINE SULFATE 5; 5; 5; 5 MG/1; MG/1; MG/1; MG/1
20 CAPSULE, EXTENDED RELEASE ORAL DAILY
Qty: 30 CAPSULE | Refills: 0 | Status: SHIPPED | OUTPATIENT
Start: 2023-11-13 | End: 2023-12-13

## 2023-11-13 RX ORDER — DEXTROAMPHETAMINE SACCHARATE, AMPHETAMINE ASPARTATE MONOHYDRATE, DEXTROAMPHETAMINE SULFATE AND AMPHETAMINE SULFATE 5; 5; 5; 5 MG/1; MG/1; MG/1; MG/1
20 CAPSULE, EXTENDED RELEASE ORAL DAILY
Qty: 30 CAPSULE | Refills: 0 | Status: SHIPPED | OUTPATIENT
Start: 2023-12-14 | End: 2024-01-13

## 2023-11-13 RX ORDER — DEXTROAMPHETAMINE SACCHARATE, AMPHETAMINE ASPARTATE MONOHYDRATE, DEXTROAMPHETAMINE SULFATE AND AMPHETAMINE SULFATE 5; 5; 5; 5 MG/1; MG/1; MG/1; MG/1
20 CAPSULE, EXTENDED RELEASE ORAL DAILY
Qty: 30 CAPSULE | Refills: 0 | Status: SHIPPED | OUTPATIENT
Start: 2024-01-14 | End: 2024-02-13

## 2023-11-13 NOTE — PROGRESS NOTES
Joann is a 48 year old who is being evaluated via a billable video visit.      How would you like to obtain your AVS? MyChart  If the video visit is dropped, the invitation should be resent by: Text to cell phone: [phone]  Will anyone else be joining your video visit? No      Assessment & Plan     ADHD (attention deficit hyperactivity disorder), combined type  Stable with no side effects but not feeling any different.  Refilled Adderall with increase in dosage to 20 mg XR tabs.  Plan to follow-up in 3 months or sooner if not improving symptoms.  - amphetamine-dextroamphetamine (ADDERALL XR) 20 MG 24 hr capsule; Take 1 capsule (20 mg) by mouth daily for 30 days  - amphetamine-dextroamphetamine (ADDERALL XR) 20 MG 24 hr capsule; Take 1 capsule (20 mg) by mouth daily for 30 days  - amphetamine-dextroamphetamine (ADDERALL XR) 20 MG 24 hr capsule; Take 1 capsule (20 mg) by mouth daily for 30 days    Uncontrolled hypertension  Patient states that she is taking 25 mg daily.  Edited order to reflect how patient is taking this medication.  Plan to follow-up in 3 months for BP recheck, lab and refills.  - losartan (COZAAR) 25 MG tablet; Take 1 tablet (25 mg) by mouth daily       See Patient Instructions    Paula Walden NP  [facility]   Joann is a 48 year old, presenting for the following health issues:  A.D.H.D    History of Present Illness       Reason for visit:  Med check    She eats 2-3 servings of fruits and vegetables daily.She consumes 0 sweetened beverage(s) daily.She exercises with enough effort to increase her heart rate 20 to 29 minutes per day.  She exercises with enough effort to increase her heart rate 4 days per week.   She is taking medications regularly.   ADHD Follow-Up    Date of last ADHD office visit: 09/28/2023  Status since last visit: Stable  Taking controlled (daily) medications as prescribed: Yes                       Parent/Patient Concerns with  Medications: None  ADHD Medication       Amphetamines Disp Start End     amphetamine-dextroamphetamine (ADDERALL XR) 10 MG 24 hr capsule    30 capsule 9/28/2023     Sig - Route: Take 1 capsule (10 mg) by mouth daily - Oral    Class: E-Prescribe    Earliest Fill Date: 9/28/2023          Sleep: no problems  Home/Family Concerns: None  Peer Concerns: None    Co-Morbid Diagnosis: Depression    Currently in counseling: No    Medication Benefits:   Controlled symptoms: not sure if this has changed with the attention span    Medication side effects:  Side effects noted: none  No weight loss    Review of Systems   CONSTITUTIONAL: NEGATIVE for fever, chills, change in weight  RESP: NEGATIVE for significant cough or SOB  CV: NEGATIVE for chest pain, palpitations or peripheral edema  PSYCHIATRIC: NEGATIVE for changes in mood or affect  ROS otherwise negative      Objective    Vitals - Patient Reported  Pain Score: No Pain (0)      Vitals:  No vitals were obtained today due to virtual visit.    Physical Exam   GENERAL: Healthy, alert and no distress  EYES: Eyes grossly normal to inspection.  No discharge or erythema, or obvious scleral/conjunctival abnormalities.  RESP: No audible wheeze, cough, or visible cyanosis.  No visible retractions or increased work of breathing.    SKIN: Visible skin clear. No significant rash, abnormal pigmentation or lesions.  NEURO: Cranial nerves grossly intact.  Mentation and speech appropriate for age.  PSYCH: Mentation appears normal, affect normal/bright, judgement and insight intact, normal speech and appearance well-groomed.    Video-Visit Details    Type of service:  Video Visit     Originating Location (pt. Location): Home  Distant Location (provider location):  On-site  Platform used for Video Visit: Antonia

## 2023-11-13 NOTE — PATIENT INSTRUCTIONS
I am increasing your adderall XR to 20 mg daily.  Plan to follow-up in 3 months or sooner if not feeling that this is helping.  Put in for refill on losartan if needed prior to 3 month appointment and I will recheck BP and lab in clinic at that time and refill for 1 year.

## 2023-12-11 ENCOUNTER — OFFICE VISIT (OUTPATIENT)
Dept: FAMILY MEDICINE | Facility: CLINIC | Age: 48
End: 2023-12-11
Attending: NURSE PRACTITIONER
Payer: COMMERCIAL

## 2023-12-11 VITALS
OXYGEN SATURATION: 98 % | RESPIRATION RATE: 16 BRPM | HEIGHT: 66 IN | SYSTOLIC BLOOD PRESSURE: 142 MMHG | BODY MASS INDEX: 24.08 KG/M2 | TEMPERATURE: 98.9 F | WEIGHT: 149.8 LBS | DIASTOLIC BLOOD PRESSURE: 88 MMHG | HEART RATE: 112 BPM

## 2023-12-11 DIAGNOSIS — F90.2 ADHD (ATTENTION DEFICIT HYPERACTIVITY DISORDER), COMBINED TYPE: Primary | ICD-10-CM

## 2023-12-11 DIAGNOSIS — I10 BENIGN ESSENTIAL HYPERTENSION: ICD-10-CM

## 2023-12-11 PROCEDURE — 99214 OFFICE O/P EST MOD 30 MIN: CPT | Performed by: NURSE PRACTITIONER

## 2023-12-11 RX ORDER — DEXTROAMPHETAMINE SACCHARATE, AMPHETAMINE ASPARTATE MONOHYDRATE, DEXTROAMPHETAMINE SULFATE AND AMPHETAMINE SULFATE 5; 5; 5; 5 MG/1; MG/1; MG/1; MG/1
20 CAPSULE, EXTENDED RELEASE ORAL DAILY
Qty: 30 CAPSULE | Refills: 0 | Status: SHIPPED | OUTPATIENT
Start: 2024-02-11 | End: 2024-03-12

## 2023-12-11 RX ORDER — DEXTROAMPHETAMINE SACCHARATE, AMPHETAMINE ASPARTATE MONOHYDRATE, DEXTROAMPHETAMINE SULFATE AND AMPHETAMINE SULFATE 5; 5; 5; 5 MG/1; MG/1; MG/1; MG/1
20 CAPSULE, EXTENDED RELEASE ORAL DAILY
Qty: 30 CAPSULE | Refills: 0 | Status: SHIPPED | OUTPATIENT
Start: 2023-12-11 | End: 2024-01-10

## 2023-12-11 RX ORDER — LOSARTAN POTASSIUM 50 MG/1
50 TABLET ORAL DAILY
Qty: 90 TABLET | Refills: 3 | Status: SHIPPED | OUTPATIENT
Start: 2023-12-11 | End: 2024-03-05

## 2023-12-11 RX ORDER — DEXTROAMPHETAMINE SACCHARATE, AMPHETAMINE ASPARTATE, DEXTROAMPHETAMINE SULFATE AND AMPHETAMINE SULFATE 2.5; 2.5; 2.5; 2.5 MG/1; MG/1; MG/1; MG/1
10 TABLET ORAL DAILY
Qty: 30 TABLET | Refills: 0 | Status: SHIPPED | OUTPATIENT
Start: 2024-02-11 | End: 2024-03-12

## 2023-12-11 RX ORDER — DEXTROAMPHETAMINE SACCHARATE, AMPHETAMINE ASPARTATE, DEXTROAMPHETAMINE SULFATE AND AMPHETAMINE SULFATE 2.5; 2.5; 2.5; 2.5 MG/1; MG/1; MG/1; MG/1
10 TABLET ORAL DAILY
Qty: 30 TABLET | Refills: 0 | Status: SHIPPED | OUTPATIENT
Start: 2024-01-11 | End: 2024-02-10

## 2023-12-11 RX ORDER — DEXTROAMPHETAMINE SACCHARATE, AMPHETAMINE ASPARTATE MONOHYDRATE, DEXTROAMPHETAMINE SULFATE AND AMPHETAMINE SULFATE 5; 5; 5; 5 MG/1; MG/1; MG/1; MG/1
20 CAPSULE, EXTENDED RELEASE ORAL DAILY
Qty: 30 CAPSULE | Refills: 0 | Status: SHIPPED | OUTPATIENT
Start: 2024-01-11 | End: 2024-02-10

## 2023-12-11 RX ORDER — DEXTROAMPHETAMINE SACCHARATE, AMPHETAMINE ASPARTATE, DEXTROAMPHETAMINE SULFATE AND AMPHETAMINE SULFATE 2.5; 2.5; 2.5; 2.5 MG/1; MG/1; MG/1; MG/1
10 TABLET ORAL DAILY
Qty: 30 TABLET | Refills: 0 | Status: SHIPPED | OUTPATIENT
Start: 2023-12-11 | End: 2024-01-10

## 2023-12-11 ASSESSMENT — PAIN SCALES - GENERAL: PAINLEVEL: NO PAIN (0)

## 2023-12-11 NOTE — PROGRESS NOTES
Assessment & Plan     ADHD (attention deficit hyperactivity disorder), combined type  Refilled 20 mg XR Adderall today and added 10 mg immediate release in the afternoon which she can take as needed if helpful to keep her focused in the afternoon.  Plan to follow-up in 3 months with in person or virtual visit.  - PRIMARY CARE FOLLOW-UP SCHEDULING  - amphetamine-dextroamphetamine (ADDERALL XR) 20 MG 24 hr capsule; Take 1 capsule (20 mg) by mouth daily for 30 days  - amphetamine-dextroamphetamine (ADDERALL XR) 20 MG 24 hr capsule; Take 1 capsule (20 mg) by mouth daily for 30 days  - amphetamine-dextroamphetamine (ADDERALL XR) 20 MG 24 hr capsule; Take 1 capsule (20 mg) by mouth daily for 30 days  - amphetamine-dextroamphetamine (ADDERALL) 10 MG tablet; Take 1 tablet (10 mg) by mouth daily for 30 days  - amphetamine-dextroamphetamine (ADDERALL) 10 MG tablet; Take 1 tablet (10 mg) by mouth daily for 30 days  - amphetamine-dextroamphetamine (ADDERALL) 10 MG tablet; Take 1 tablet (10 mg) by mouth daily for 30 days    Benign essential hypertension  Mildly elevated BP.  Will increase losartan to 50 mg to get better control and see if this improves with lowering the BP too much.  She will continue to monitor this at home and let me know if too low.  Plan to follow-up with lab in 1 month to recheck electrolytes and kidney function.  - losartan (COZAAR) 50 MG tablet; Take 1 tablet (50 mg) by mouth daily  - Basic metabolic panel  (Ca, Cl, CO2, Creat, Gluc, K, Na, BUN); Future    See Patient Instructions    Paula Walden NP  [facility]    Sage David is a 48 year old, presenting for the following health issues:  Hypertension (Declined vaccines today ) and Attention Deficit Disorder        12/11/2023     9:20 AM   Additional Questions   Roomed by rmb   Accompanied by self         12/11/2023     9:20 AM   Patient Reported Additional Medications   Patient reports taking the following new  medications none       History of Present Illness       Reason for visit:  Follow up on meds    She eats 4 or more servings of fruits and vegetables daily.She consumes 0 sweetened beverage(s) daily.She exercises with enough effort to increase her heart rate 30 to 60 minutes per day.  She exercises with enough effort to increase her heart rate 4 days per week.   She is taking medications regularly.    Felt like overanalyzing at first, but now feels like things are going well.  Friends can tell the change with starting the medications.  Able to communicate needs for understanding.  Not sure about the afternoon yet due to being busy with son's health issue recently and also a 's cousin  unknown cause an also  aunt (mother of the cousin that ) also is dealing with cancer and was in the ER.  So a lot going on in her life currently.  She also has issues with a son in college with tuition that has occurred to add in the mix. Continues to have BP's in 130/80's and today in clinic 140/upper 80-90's.  She is taking Cozaar 25 mg.      Hypertension Follow-up    Do you check your blood pressure regularly outside of the clinic? Yes   Are you following a low salt diet? No  Are your blood pressures ever more than 140 on the top number (systolic) OR more   than 90 on the bottom number (diastolic), for example 140/90? No    ADD Follow-Up    Date of last ADHD office visit: 2023  Status since last visit: Improving  Taking controlled (daily) medications as prescribed: Yes                       Parent/Patient Concerns with Medications: None    ADHD Medication       Amphetamines Disp Start End     amphetamine-dextroamphetamine (ADDERALL XR) 20 MG 24 hr capsule    30 capsule 2023    Sig - Route: Take 1 capsule (20 mg) by mouth daily for 30 days - Oral    Class: E-Prescribe    Earliest Fill Date: 2023     amphetamine-dextroamphetamine (ADDERALL XR) 20 MG 24 hr capsule    30 capsule  "12/14/2023 1/13/2024    Sig - Route: Take 1 capsule (20 mg) by mouth daily for 30 days - Oral    Patient not taking: Reported on 12/11/2023       Class: E-Prescribe    Earliest Fill Date: 12/11/2023     amphetamine-dextroamphetamine (ADDERALL XR) 20 MG 24 hr capsule    30 capsule 1/14/2024 2/13/2024    Sig - Route: Take 1 capsule (20 mg) by mouth daily for 30 days - Oral    Patient not taking: Reported on 12/11/2023       Class: E-Prescribe    Earliest Fill Date: 1/11/2024              Sleep: no problems  Home/Family Concerns: None  Peer Concerns: Stable    Co-Morbid Diagnosis: None    Currently in counseling: No    Medication Benefits:   Controlled symptoms: Attention span, Distractability, Finishing tasks, Impulse control, Frustration tolerance, Accepting limits, and Peer relations  Uncontrolled Symptoms : None    Medication side effects:  Side effects noted: none, less stress eating noticed, weight is maintained and healthy    Review of Systems   CONSTITUTIONAL: NEGATIVE for fever, chills, change in weight  RESP: NEGATIVE for significant cough or SOB  CV: NEGATIVE for chest pain, palpitations or peripheral edema  PSYCHIATRIC: NEGATIVE for changes in mood or affect  ROS otherwise negative      Objective    BP (!) 162/98   Pulse 112   Temp 98.9  F (37.2  C) (Tympanic)   Resp 16   Ht 1.676 m (5' 6\")   Wt 67.9 kg (149 lb 12.8 oz)   LMP  (LMP Unknown)   SpO2 98%   BMI 24.18 kg/m    Body mass index is 24.18 kg/m .  Physical Exam   GENERAL: healthy, alert and no distress  RESP: lungs clear to auscultation - no rales, rhonchi or wheezes  CV: regular rate and rhythm, normal S1 S2, no S3 or S4, no murmur, click or rub, no peripheral edema and peripheral pulses strong  PSYCH: mentation appears normal, affect normal/bright      "

## 2023-12-11 NOTE — PATIENT INSTRUCTIONS
Call [phone] to schedule colonoscopy.    Refilled the 20 mg XR Adderall.  Adding 10 mg immediate release to take in the afternoon around 12-1 pm to start to see how this affects your afternoon attention span.    Keep watching BP at home and let me know if over 140/90 consistently.  Increase your losartan to 50 mg daily and make lab appointment in 1 month for recheck.

## 2024-01-15 ENCOUNTER — LAB (OUTPATIENT)
Dept: LAB | Facility: CLINIC | Age: 49
End: 2024-01-15
Payer: COMMERCIAL

## 2024-01-15 ENCOUNTER — E-VISIT (OUTPATIENT)
Dept: FAMILY MEDICINE | Facility: CLINIC | Age: 49
End: 2024-01-15
Payer: COMMERCIAL

## 2024-01-15 DIAGNOSIS — F90.2 ADHD (ATTENTION DEFICIT HYPERACTIVITY DISORDER), COMBINED TYPE: Primary | ICD-10-CM

## 2024-01-15 DIAGNOSIS — I10 BENIGN ESSENTIAL HYPERTENSION: ICD-10-CM

## 2024-01-15 LAB
ANION GAP SERPL CALCULATED.3IONS-SCNC: 14 MMOL/L (ref 7–15)
BUN SERPL-MCNC: 6.5 MG/DL (ref 6–20)
CALCIUM SERPL-MCNC: 9 MG/DL (ref 8.6–10)
CHLORIDE SERPL-SCNC: 103 MMOL/L (ref 98–107)
CREAT SERPL-MCNC: 0.77 MG/DL (ref 0.51–0.95)
DEPRECATED HCO3 PLAS-SCNC: 24 MMOL/L (ref 22–29)
EGFRCR SERPLBLD CKD-EPI 2021: >90 ML/MIN/1.73M2
GLUCOSE SERPL-MCNC: 91 MG/DL (ref 70–99)
POTASSIUM SERPL-SCNC: 4.2 MMOL/L (ref 3.4–5.3)
SODIUM SERPL-SCNC: 141 MMOL/L (ref 135–145)

## 2024-01-15 PROCEDURE — 99207 PR NON-BILLABLE SERV PER CHARTING: CPT | Performed by: NURSE PRACTITIONER

## 2024-01-15 PROCEDURE — 36415 COLL VENOUS BLD VENIPUNCTURE: CPT

## 2024-01-15 PROCEDURE — 80048 BASIC METABOLIC PNL TOTAL CA: CPT

## 2024-01-15 NOTE — PATIENT INSTRUCTIONS
Thank you for choosing us for your care. I think an in-clinic visit would be best next steps based on your symptoms. Please schedule a clinic appointment; you won t be charged for this eVisit.      You can schedule an appointment right here in [facility], or call [phone]

## 2024-01-24 ENCOUNTER — MYC MEDICAL ADVICE (OUTPATIENT)
Dept: FAMILY MEDICINE | Facility: CLINIC | Age: 49
End: 2024-01-24

## 2024-02-26 ENCOUNTER — NURSE TRIAGE (OUTPATIENT)
Dept: FAMILY MEDICINE | Facility: CLINIC | Age: 49
End: 2024-02-26
Payer: COMMERCIAL

## 2024-02-26 NOTE — TELEPHONE ENCOUNTER
Symptoms    Describe your symptoms: On Saturday 02/24, pt passed out and has 2 seizures.  Friend who was with her observed this.  Ambulance was called, pt was checked out.  Suggested she go to [facility].  Pt declined to do this as she was doing fine and thought there were other factors that happened during the day that could have contributed to this happening.  Feeling fine today.  Wonders if someone can call her back and let her know if she should be doing anything else?  She is aware that Paula is out today.          Could we send this information to you in [employer] or would you prefer to receive a phone call?:   Patient would prefer a phone call   Okay to leave a detailed message?: Yes at Cell number on file:    Telephone Information:   Mobile [phone]       Abena Kirk on 2/26/2024 at 12:55 PM

## 2024-02-27 NOTE — TELEPHONE ENCOUNTER
Left message for patient to return call to clinic.     Mimi Valverde RN  [facility]

## 2024-02-28 NOTE — TELEPHONE ENCOUNTER
"Nurse Triage SBAR    Is this a 2nd Level Triage? YES, LICENSED PRACTITIONER REVIEW IS REQUIRED    Answer Assessment - Initial Assessment Questions  1. ONSET: \"When did the seizure occur?\"        2/24/24 around 0830; EMT at [facility] called ambulance and patient was instructed to be evaluated at Emergency Department but patient declined.    2. DURATION: \"How long did the seizure last (or how long has it been happening)?\" (e.g., seconds, minutes)  Note: Most seizures last less than 5 minutes.       1 minute for the first seizure and 3 minutes  for the 2nd seizure that occurred very close together and observed by EMT that was at the [facility]. Patient reports drinking alcohol the night before, felt dehydrated Saturday, and had not eaten yet.    3. DESCRIPTION: \"Describe what happened during the seizure.\" \"Did the body become stiff?\" \"Was there any jerking?\"  \"Did they lose consciousness during the seizure?\"        EMT saw patient's eyes fluttering when she was standing and EMT brought patient to the ground. Patient lost consciousness     4. CIRCUMSTANCE: \"What was the person doing when the seizure began?\"         Standing and talking to EMS about sports and kids and patient was feeling tired but tried to hold a yawn in then her eyes started fluttering. Patient remembers seeing bright lights before she lost consciousness.    5. MENTAL STATUS AFTER SEIZURE: \"Does the person seem more groggy or sleepy?\" \"Does the person know who they are, who you are, and where they are now?\"         Patient had stiff muscles/cramps in her left thigh     6. PRIOR SEIZURES: \"Has the person had a seizure (convulsion) before?\" (e.g., epilepsy, other cause)  If Yes, ask: \"When was the last time?\" and \"What happened last time?\"         2004 when she was pregnant with her first child and she had not eaten much that day     7. EPILEPSY: \"Does the person have epilepsy?\" Note: Check for medical ID kaia.      no  8. MEDICINES: \"Does the " "person take anticonvulsant medications?\" (e.g., Yes, No; missed doses, any recent changes)      none    9. INJURY: \"Was the person hurt or injured during the seizure?\" (e.g., hit their head, bit their tongue)      none    10. OTHER SYMPTOMS: \"Are there any other symptoms?\" (e.g., fever, headache)        none    11. PREGNANCY: \"Is there any chance you are pregnant?\" \"When was your last menstrual period?\"        no    Protocols used: [id number]     Protocol Recommended Disposition:   Home Care    RN Protocol not address exact situation due to seizure occurred on 2/24/24.   RN scheduled patient with PCP for office visit 3/4/24.    Recommendation: please advise if ok for patient to wait to be evaluated at 3/4/24 appointment with PCP (this was the soonest available)     Routed to provider    Sil Keene RN on 2/28/2024 at 1:40 PM    "

## 2024-02-28 NOTE — TELEPHONE ENCOUNTER
"Patient calling back and      Reason for Disposition   Seizure lasting < 5 minutes with a history of prior seizure(s), and taking anticonvulsants    Additional Information   Negative: First seizure ever   Negative: Seizure lasts > 5 minutes   Negative: Two or more seizures and stays confused between seizures   Negative: Bluish (or gray) lips or face   Negative: Head injury caused the seizure   Negative: Pregnant or postpartum (from 0 to 6 weeks after delivery)   Negative: Known poisoning or overdose   Negative: Seizure in a swimming pool   Negative: Diabetes mellitus  (Exception: Now alert, acting normal, and has normal blood glucose [e.g., > 70 mg/dL or 3.9 mmol/L].)   Negative: Unresponsive (can't be awakened) after the seizure stops and persists > 5 minutes   Negative: Acting confused (e.g., disoriented, slurred speech) after the seizure stops and persists > 30 minutes   Negative: Sounds like a life-threatening emergency to the triager   Negative: SEVERE headache  (Note: Most people have a headache after a seizure.)   Negative: Second seizure occurs on the same day   Negative: Fever > 100.4 F (38.0 C)   Negative: Substance use (drug use) or unhealthy alcohol use, known suspected   Negative: Patient sounds very sick or weak to the triager   Negative: Wants to sleep after the seizure and persists much longer than usual   Negative: Not on or ran out of seizure medicine (anticonvulsant)   Negative: Stopped taking seizure medicine (anticonvulsant)   Negative: Epileptic seizures occur frequently (several per week)   Negative: Seizure lasting of unknown duration with a history of prior seizure(s), and taking anticonvulsants   Negative: Patient wants to be seen    Answer Assessment - Initial Assessment Questions  1. ONSET: \"When did the seizure occur?\"      2/24/24 around 0830  2. DURATION: \"How long did the seizure last (or how long has it been happening)?\" (e.g., seconds, minutes)  Note: Most seizures last less than 5 " "minutes.     1 minute for the first seizure and 3 minutes  for the 2nd seizure that occurred very close together and observed [place]. Patient reports drinking alcohol the night before, felt dehydrated Saturday, and had not eaten yet.  3. DESCRIPTION: \"Describe what happened during the seizure.\" \"Did the body become stiff?\" \"Was there any jerking?\"  \"Did they lose consciousness during the seizure?\"      EMT saw patient's eyes fluttering when she was standing and EMT brought patient to the ground. Patient lost consciousness   4. CIRCUMSTANCE: \"What was the person doing when the seizure began?\"       Standing and talking to EMS about sports and kids and patient was feeling tired but tried to hold a yawn in then her eyes started fluttering. Patient remembers seeing bright lights before she lost consciousness.    5. MENTAL STATUS AFTER SEIZURE: \"Does the person seem more groggy or sleepy?\" \"Does the person know who they are, who you are, and where they are now?\"       Patient had stiff muscles/cramps in her left thigh     6. PRIOR SEIZURES: \"Has the person had a seizure (convulsion) before?\" (e.g., epilepsy, other cause)  If Yes, ask: \"When was the last time?\" and \"What happened last time?\"       2004 when she was pregnant with her first child and she had not eaten much that day   7. EPILEPSY: \"Does the person have epilepsy?\" Note: Check for medical ID bracelet.      no  8. MEDICINES: \"Does the person take anticonvulsant medications?\" (e.g., Yes, No; missed doses, any recent changes)      none  9. INJURY: \"Was the person hurt or injured during the seizure?\" (e.g., hit their head, bit their tongue)      none  10. OTHER SYMPTOMS: \"Are there any other symptoms?\" (e.g., fever, headache)        none  11. PREGNANCY: \"Is there any chance you are pregnant?\" \"When was your last menstrual period?\"        none    Protocols used: [id number]      "

## 2024-02-29 NOTE — TELEPHONE ENCOUNTER
This should be fine with no history of seizures.  However, if she has another one she should be seen in the ER.  Paula Walden NP on 2/29/2024 at 8:06 AM

## 2024-03-01 ENCOUNTER — MYC MEDICAL ADVICE (OUTPATIENT)
Dept: FAMILY MEDICINE | Facility: CLINIC | Age: 49
End: 2024-03-01
Payer: COMMERCIAL

## 2024-03-01 NOTE — TELEPHONE ENCOUNTER
I spoke with patients daughter Starr Romero (on [employer]) because patient was in dialysis. I told her that an RX had been sent to the pharmacy and I gave her the directions on how her mother is supposed to start it. See [employer] message.  Patient has virtual appointment on 3/4 for medication review. She has an in person appointment on 3/5 for seizure follow up.    Please advise if okay to discuss everything on 3/5.    Thank you,  Azul López RN

## 2024-03-01 NOTE — TELEPHONE ENCOUNTER
Yes cancel virtual appointment and keep the in person appointment since they are only a day apart.  Paula Walden NP on 3/1/2024 at 11:50 AM

## 2024-03-05 ENCOUNTER — OFFICE VISIT (OUTPATIENT)
Dept: FAMILY MEDICINE | Facility: CLINIC | Age: 49
End: 2024-03-05
Payer: COMMERCIAL

## 2024-03-05 VITALS
HEART RATE: 99 BPM | SYSTOLIC BLOOD PRESSURE: 168 MMHG | OXYGEN SATURATION: 99 % | TEMPERATURE: 98.2 F | DIASTOLIC BLOOD PRESSURE: 120 MMHG | HEIGHT: 66 IN | BODY MASS INDEX: 22.92 KG/M2 | WEIGHT: 142.6 LBS | RESPIRATION RATE: 12 BRPM

## 2024-03-05 DIAGNOSIS — I10 BENIGN ESSENTIAL HYPERTENSION: Primary | ICD-10-CM

## 2024-03-05 DIAGNOSIS — R55 VASOVAGAL SYNCOPE: ICD-10-CM

## 2024-03-05 PROCEDURE — 99214 OFFICE O/P EST MOD 30 MIN: CPT | Performed by: NURSE PRACTITIONER

## 2024-03-05 RX ORDER — LISINOPRIL AND HYDROCHLOROTHIAZIDE 12.5; 2 MG/1; MG/1
1 TABLET ORAL DAILY
Qty: 30 TABLET | Refills: 1 | Status: SHIPPED | OUTPATIENT
Start: 2024-03-05 | End: 2024-03-29 | Stop reason: DRUGHIGH

## 2024-03-05 ASSESSMENT — ENCOUNTER SYMPTOMS: SEIZURES: 1

## 2024-03-05 ASSESSMENT — PAIN SCALES - GENERAL: PAINLEVEL: NO PAIN (0)

## 2024-03-05 NOTE — PATIENT INSTRUCTIONS
You most likely had a vasovagal episode that caused your seizure like behavior.  Make sure you are drinking fluids daily since dehydration can precipitate these symptoms by causing your blood pressure to go low fast.  Stop Losartan and start Lisinopril-hydrochlorothiazide 20-12.5 mg  Follow-up in 1 week for nursing BP check in clinic and we will see how your BP is doing.  Stop your adderall while we get the BP under control.

## 2024-03-05 NOTE — PROGRESS NOTES
Assessment & Plan     Benign essential hypertension  Patient's blood pressure continues to be quite elevated.  I have discussed with her that she needs to discontinue her Adderall and we will change her medication over to lisinopril 20 mg with HCTZ 12.5 mg and have her follow-up in a week for nurse visit and then adjust as needed until her blood pressure comes back down to normal.  At that time we could restart the Adderall.  Plan would be to follow-up in 1 month for lab and recheck as well.  - lisinopril-hydrochlorothiazide (ZESTORETIC) 20-12.5 MG tablet; Take 1 tablet by mouth daily    Vasovagal syncope  Discussed with patient that when you have a vasovagal episode it can resemble seizure-like activity.  Since she has no history of seizures and has not had any repeat events, due to dehydration and alcohol the night before along with not getting any sleep this most likely was the cause of her vasovagal symptoms.  I recommend that she continues to push fluid while on blood pressure medication since this is lowering her blood pressure and we can follow-up in clinic if any recurrent symptoms happen.    See Patient Instructions    Subjective   Joann is a 49 year old, presenting for the following health issues:  Seizures (Follow up), Hypertension (Would like to change meds , lisinopril would like to have this instead  ), and Health Maintenance (No shots )        3/5/2024    10:58 AM   Additional Questions   Roomed by sindy jiames   Accompanied by self         3/5/2024    10:58 AM   Patient Reported Additional Medications   Patient reports taking the following new medications none     History of Present Illness       Hypertension: She presents for follow up of hypertension.  She does not check blood pressure  regularly outside of the clinic. Outside blood pressures have been over 140/90. She follows a low salt diet.     Reason for visit:  Discuss how meds are going /situation on 2/24    She eats 2-3 servings of fruits  and vegetables daily.She consumes 0 sweetened beverage(s) daily.She exercises with enough effort to increase her heart rate 30 to 60 minutes per day.  She exercises with enough effort to increase her heart rate 5 days per week.   She is taking medications regularly.     Chief Complaint   Patient presents with    Seizures     Follow up    Hypertension     Would like to change meds , lisinopril would like to have this instead      Health Maintenance     No shots      Hypertension Follow-up    Do you check your blood pressure regularly outside of the clinic? No   Are you following a low salt diet? Yes  Are your blood pressures ever more than 140 on the top number (systolic) OR more   than 90 on the bottom number (diastolic), for example 140/90? No    How many servings of fruits and vegetables do you eat daily?  2-3  On average, how many sweetened beverages do you drink each day (Examples: soda, juice, sweet tea, etc.  Do NOT count diet or artificially sweetened beverages)?   0  How many days per week do you exercise enough to make your heart beat faster? 5  How many minutes a day do you exercise enough to make your heart beat faster? 30 - 60  How many days per week do you miss taking your medication? 0    Concern - seizures  Onset: 2/24/24  Description: seizures  Intensity: mild  Progression of Symptoms:  has not happen since then  Accompanying Signs & Symptoms: EMT at [facility] called ambulance and patient was instructed to be evaluated at Emergency Department but patient declined, 1 minute for the first seizure and 3 minutes  for the 2nd seizure that occurred very close together and observed by EMT that was at the [facility]. Patient reports drinking alcohol the night before, felt dehydrated Saturday, and had not eaten yet. EMT saw patient's eyes fluttering when she was standing and EMT brought patient to the ground. Patient lost consciousness  Standing and talking to EMS about sports and kids and patient was  "feeling tired but tried to hold a yawn in then her eyes started fluttering. Patient remembers seeing bright lights before she lost consciousness.  Patient had stiff muscles/cramps in her left thigh   Previous history of similar problem: only other time this happen was   2004 when she was pregnant with her first child and she had not eaten much that day   Precipitating factors:        Worsened by: not from eat  Alleviating factors:        Improved by: just eating  Therapies tried and outcome: eating       Review of Systems  CONSTITUTIONAL: NEGATIVE for fever, chills, change in weight  RESP: NEGATIVE for significant cough or SOB  CV: NEGATIVE for chest pain, palpitations or peripheral edema  PSYCHIATRIC: NEGATIVE for changes in mood or affect  ROS otherwise negative      Objective    BP (!) 158/100 (BP Location: Left arm, Patient Position: Sitting, Cuff Size: Adult Regular)   Pulse 99   Temp 98.2  F (36.8  C) (Tympanic)   Resp 12   Ht 1.676 m (5' 6\")   Wt 64.7 kg (142 lb 9.6 oz)   LMP  (LMP Unknown)   SpO2 99%   BMI 23.02 kg/m    Body mass index is 23.02 kg/m .  Physical Exam   GENERAL: alert and no distress  RESP: lungs clear to auscultation - no rales, rhonchi or wheezes  CV: regular rate and rhythm, normal S1 S2, no S3 or S4, no murmur, click or rub, no peripheral edema  NEURO: Normal strength and tone, mentation intact and speech normal  PSYCH: mentation appears normal, affect normal/bright      Signed Electronically by: Paula Walden NP  "

## 2024-03-08 ENCOUNTER — MYC MEDICAL ADVICE (OUTPATIENT)
Dept: FAMILY MEDICINE | Facility: CLINIC | Age: 49
End: 2024-03-08
Payer: COMMERCIAL

## 2024-03-12 ENCOUNTER — ALLIED HEALTH/NURSE VISIT (OUTPATIENT)
Dept: FAMILY MEDICINE | Facility: CLINIC | Age: 49
End: 2024-03-12
Payer: COMMERCIAL

## 2024-03-12 ENCOUNTER — TELEPHONE (OUTPATIENT)
Dept: FAMILY MEDICINE | Facility: CLINIC | Age: 49
End: 2024-03-12

## 2024-03-12 VITALS — HEART RATE: 103 BPM | DIASTOLIC BLOOD PRESSURE: 91 MMHG | SYSTOLIC BLOOD PRESSURE: 136 MMHG

## 2024-03-12 DIAGNOSIS — I10 BENIGN ESSENTIAL HYPERTENSION: Primary | ICD-10-CM

## 2024-03-12 DIAGNOSIS — Z01.30 BLOOD PRESSURE CHECK: Primary | ICD-10-CM

## 2024-03-12 PROCEDURE — 99207 PR NO CHARGE NURSE ONLY: CPT

## 2024-03-12 NOTE — NURSING NOTE
"Joann presents to clinic for a blood pressure check. Today's first reading is 136/91 with pulse of 103 and second reading is 137/98 with pulse of 103. She is off the ADD meds but states she is starting to have concentration issues again and starting to feel like she did before she starting taking it. She states she is feeling just fine with no chest pain or headaches with the elevated BP. She is taking the lisinopril/hydrochlorothiazide as directed. States \"my at home BP as been 130/80 and 125/86\". She also has an appointment with Paula Walden on 3/29/24. I will forward these results to Paula Walden.    Sandra Ace RN    "

## 2024-03-13 RX ORDER — HYDROCHLOROTHIAZIDE 12.5 MG/1
12.5 TABLET ORAL DAILY
Qty: 30 TABLET | Refills: 0 | Status: SHIPPED | OUTPATIENT
Start: 2024-03-13 | End: 2024-03-29 | Stop reason: DRUGHIGH

## 2024-03-13 NOTE — TELEPHONE ENCOUNTER
I have ordered an increase in the hydrochlorothiazide dose adding a 12.5 mg tab to current Lisinopril 20-12.5 mg.  I would like her to start this and recheck BP in 1 week.  I want good control if we are going to restart adderall again.  Have her watch for lows and if this occurs go back to prior dose and we can discuss next steps.  I sent this to [facility] nadeen.  Paula Walden NP on 3/13/2024 at 1:47 AM

## 2024-03-22 ENCOUNTER — MYC MEDICAL ADVICE (OUTPATIENT)
Dept: FAMILY MEDICINE | Facility: CLINIC | Age: 49
End: 2024-03-22
Payer: COMMERCIAL

## 2024-03-29 ENCOUNTER — OFFICE VISIT (OUTPATIENT)
Dept: FAMILY MEDICINE | Facility: CLINIC | Age: 49
End: 2024-03-29
Attending: NURSE PRACTITIONER
Payer: COMMERCIAL

## 2024-03-29 VITALS
OXYGEN SATURATION: 98 % | HEIGHT: 66 IN | HEART RATE: 103 BPM | TEMPERATURE: 98.5 F | WEIGHT: 141.4 LBS | SYSTOLIC BLOOD PRESSURE: 118 MMHG | DIASTOLIC BLOOD PRESSURE: 68 MMHG | RESPIRATION RATE: 16 BRPM | BODY MASS INDEX: 22.73 KG/M2

## 2024-03-29 DIAGNOSIS — F90.2 ADHD (ATTENTION DEFICIT HYPERACTIVITY DISORDER), COMBINED TYPE: ICD-10-CM

## 2024-03-29 DIAGNOSIS — I10 BENIGN ESSENTIAL HYPERTENSION: Primary | ICD-10-CM

## 2024-03-29 LAB
ANION GAP SERPL CALCULATED.3IONS-SCNC: 9 MMOL/L (ref 7–15)
BUN SERPL-MCNC: 9.3 MG/DL (ref 6–20)
CALCIUM SERPL-MCNC: 9.4 MG/DL (ref 8.6–10)
CHLORIDE SERPL-SCNC: 99 MMOL/L (ref 98–107)
CREAT SERPL-MCNC: 0.82 MG/DL (ref 0.51–0.95)
DEPRECATED HCO3 PLAS-SCNC: 29 MMOL/L (ref 22–29)
EGFRCR SERPLBLD CKD-EPI 2021: 86 ML/MIN/1.73M2
GLUCOSE SERPL-MCNC: 93 MG/DL (ref 70–99)
POTASSIUM SERPL-SCNC: 4.5 MMOL/L (ref 3.4–5.3)
SODIUM SERPL-SCNC: 137 MMOL/L (ref 135–145)

## 2024-03-29 PROCEDURE — 80048 BASIC METABOLIC PNL TOTAL CA: CPT | Performed by: NURSE PRACTITIONER

## 2024-03-29 PROCEDURE — 99214 OFFICE O/P EST MOD 30 MIN: CPT | Performed by: NURSE PRACTITIONER

## 2024-03-29 PROCEDURE — 36415 COLL VENOUS BLD VENIPUNCTURE: CPT | Performed by: NURSE PRACTITIONER

## 2024-03-29 RX ORDER — LISINOPRIL AND HYDROCHLOROTHIAZIDE 20; 25 MG/1; MG/1
1 TABLET ORAL DAILY
Qty: 90 TABLET | Refills: 3 | Status: SHIPPED | OUTPATIENT
Start: 2024-03-29

## 2024-03-29 RX ORDER — DEXTROAMPHETAMINE SACCHARATE, AMPHETAMINE ASPARTATE MONOHYDRATE, DEXTROAMPHETAMINE SULFATE AND AMPHETAMINE SULFATE 5; 5; 5; 5 MG/1; MG/1; MG/1; MG/1
20 CAPSULE, EXTENDED RELEASE ORAL DAILY
Qty: 30 CAPSULE | Refills: 0 | Status: SHIPPED | OUTPATIENT
Start: 2024-05-30 | End: 2024-06-29

## 2024-03-29 RX ORDER — DEXTROAMPHETAMINE SACCHARATE, AMPHETAMINE ASPARTATE MONOHYDRATE, DEXTROAMPHETAMINE SULFATE AND AMPHETAMINE SULFATE 5; 5; 5; 5 MG/1; MG/1; MG/1; MG/1
20 CAPSULE, EXTENDED RELEASE ORAL DAILY
Qty: 30 CAPSULE | Refills: 0 | Status: SHIPPED | OUTPATIENT
Start: 2024-03-29 | End: 2024-04-28

## 2024-03-29 RX ORDER — DEXTROAMPHETAMINE SACCHARATE, AMPHETAMINE ASPARTATE, DEXTROAMPHETAMINE SULFATE AND AMPHETAMINE SULFATE 2.5; 2.5; 2.5; 2.5 MG/1; MG/1; MG/1; MG/1
10 TABLET ORAL DAILY
Qty: 30 TABLET | Refills: 0 | Status: SHIPPED | OUTPATIENT
Start: 2024-05-30 | End: 2024-06-29

## 2024-03-29 RX ORDER — DEXTROAMPHETAMINE SACCHARATE, AMPHETAMINE ASPARTATE, DEXTROAMPHETAMINE SULFATE AND AMPHETAMINE SULFATE 2.5; 2.5; 2.5; 2.5 MG/1; MG/1; MG/1; MG/1
10 TABLET ORAL DAILY
Qty: 30 TABLET | Refills: 0 | Status: SHIPPED | OUTPATIENT
Start: 2024-04-29 | End: 2024-05-29

## 2024-03-29 RX ORDER — DEXTROAMPHETAMINE SACCHARATE, AMPHETAMINE ASPARTATE MONOHYDRATE, DEXTROAMPHETAMINE SULFATE AND AMPHETAMINE SULFATE 5; 5; 5; 5 MG/1; MG/1; MG/1; MG/1
20 CAPSULE, EXTENDED RELEASE ORAL DAILY
Qty: 30 CAPSULE | Refills: 0 | Status: SHIPPED | OUTPATIENT
Start: 2024-04-29 | End: 2024-05-29

## 2024-03-29 RX ORDER — DEXTROAMPHETAMINE SACCHARATE, AMPHETAMINE ASPARTATE, DEXTROAMPHETAMINE SULFATE AND AMPHETAMINE SULFATE 2.5; 2.5; 2.5; 2.5 MG/1; MG/1; MG/1; MG/1
10 TABLET ORAL DAILY
Qty: 30 TABLET | Refills: 0 | Status: SHIPPED | OUTPATIENT
Start: 2024-03-29 | End: 2024-04-28

## 2024-03-29 RX ORDER — LISINOPRIL AND HYDROCHLOROTHIAZIDE 12.5; 2 MG/1; MG/1
1 TABLET ORAL DAILY
Qty: 30 TABLET | Refills: 1 | Status: CANCELLED | OUTPATIENT
Start: 2024-03-29

## 2024-03-29 RX ORDER — HYDROCHLOROTHIAZIDE 12.5 MG/1
12.5 TABLET ORAL DAILY
Qty: 30 TABLET | Refills: 0 | Status: CANCELLED | OUTPATIENT
Start: 2024-03-29

## 2024-03-29 ASSESSMENT — PAIN SCALES - GENERAL: PAINLEVEL: NO PAIN (0)

## 2024-03-29 NOTE — PATIENT INSTRUCTIONS
I refilled one tab of Lisinopril 20 mg and hydrochlorothiazide 25mg  BMP today to recheck electrolytes and kidney function.  I will notify you of results on Mychart.  Restart Adderall as prior with dosing.  Follow-up in 3 months virtual appointment for refills, and then follow-up in person in 6 months.

## 2024-03-29 NOTE — PROGRESS NOTES
Assessment & Plan     Benign essential hypertension  Blood pressure is stable.  Refilled Zestoretic 20 to 25 mg tabs daily for 1 year.  BMP ordered for evaluation of kidney function electrolytes with new dosing.  Follow-up recommended in 1 year for refills.  - lisinopril-hydrochlorothiazide (ZESTORETIC) 20-25 MG tablet; Take 1 tablet by mouth daily  - Basic metabolic panel  (Ca, Cl, CO2, Creat, Gluc, K, Na, BUN); Future    ADHD (attention deficit hyperactivity disorder), combined type  Due to improvement in blood pressure and stable weight, will restart Adderall at previous dosing of extended release 20 mg in the morning and a 10 mg immediate release in the afternoon when needed.  Recommend follow-up in 3 months for virtual visit for refills.  I will plan to reset her contract for 6 months.  She comes in personally for her refills.  - PRIMARY CARE FOLLOW-UP SCHEDULING  - amphetamine-dextroamphetamine (ADDERALL XR) 20 MG 24 hr capsule; Take 1 capsule (20 mg) by mouth daily for 30 days  - amphetamine-dextroamphetamine (ADDERALL XR) 20 MG 24 hr capsule; Take 1 capsule (20 mg) by mouth daily for 30 days  - amphetamine-dextroamphetamine (ADDERALL XR) 20 MG 24 hr capsule; Take 1 capsule (20 mg) by mouth daily for 30 days  - amphetamine-dextroamphetamine (ADDERALL) 10 MG tablet; Take 1 tablet (10 mg) by mouth daily for 30 days  - amphetamine-dextroamphetamine (ADDERALL) 10 MG tablet; Take 1 tablet (10 mg) by mouth daily for 30 days  - amphetamine-dextroamphetamine (ADDERALL) 10 MG tablet; Take 1 tablet (10 mg) by mouth daily for 30 days  - PRIMARY CARE FOLLOW-UP SCHEDULING; Future    See Patient Instructions    Sage David is a 49 year old, presenting for the following health issues:  Hypertension        3/29/2024     8:26 AM   Additional Questions   Roomed by rmb   Accompanied by self         3/29/2024     8:26 AM   Patient Reported Additional Medications   Patient reports taking the following new medications none  "    History of Present Illness       Hypertension: She presents for follow up of hypertension.  She does check blood pressure  regularly outside of the clinic. Outside blood pressures have been over 140/90. She follows a low salt diet.     She eats 2-3 servings of fruits and vegetables daily.She consumes 0 sweetened beverage(s) daily.She exercises with enough effort to increase her heart rate 30 to 60 minutes per day.  She exercises with enough effort to increase her heart rate 6 days per week.   She is taking medications regularly.     Patient has noticed blood pressure has improved on increase in dose.  She is excited to get back on her ADHD and friends have noticed that her attention is  less being off her medication.  She does have concerns about thyroid but I reviewed labs that were completed in 1023 which showed normal thyroid function.      Review of Systems  CONSTITUTIONAL: NEGATIVE for fever, chills, change in weight  RESP: NEGATIVE for significant cough or SOB  CV: NEGATIVE for chest pain, palpitations or peripheral edema  PSYCHIATRIC: NEGATIVE for changes in mood or affect  ROS otherwise negative      Objective    /68   Pulse 103   Temp 98.5  F (36.9  C) (Tympanic)   Resp 16   Ht 1.67 m (5' 5.75\")   Wt 64.1 kg (141 lb 6.4 oz)   LMP  (LMP Unknown)   SpO2 98%   BMI 23.00 kg/m    Body mass index is 23 kg/m .  Physical Exam   GENERAL: alert and no distress  RESP: lungs clear to auscultation - no rales, rhonchi or wheezes  CV: regular rate and rhythm, normal S1 S2, no S3 or S4, no murmur, click or rub, no peripheral edema  PSYCH: mentation appears normal, affect normal/bright    Signed Electronically by: Paula Walden NP    "

## 2024-05-03 ENCOUNTER — ANESTHESIA EVENT (OUTPATIENT)
Dept: GASTROENTEROLOGY | Facility: CLINIC | Age: 49
End: 2024-05-03
Payer: COMMERCIAL

## 2024-05-03 NOTE — ANESTHESIA PREPROCEDURE EVALUATION
"Anesthesia Pre-Procedure Evaluation    Patient: Joann Maurer   MRN: [id number] : 1975        Procedure : Procedure(s):  Colonoscopy          Past Medical History:   Diagnosis Date    Anxiety     Depression     Exercise-induced asthma       Past Surgical History:   Procedure Laterality Date    BIOPSY BREAST      HC ABLATION, ENDOMETRIAL, THERMAL, W/O HYSTEROSCOPIC GUIDANCE        Allergies   Allergen Reactions    Penicillins     Sulfa Antibiotics       Social History     Tobacco Use    Smoking status: Never    Smokeless tobacco: Never    Tobacco comments:     \"On\" nicotine pouches   Substance Use Topics    Alcohol use: Yes     Comment: socially      Wt Readings from Last 1 Encounters:   24 64.1 kg (141 lb 6.4 oz)        Anesthesia Evaluation            ROS/MED HX  ENT/Pulmonary:     (+)                      asthma                  Neurologic:       Cardiovascular:       METS/Exercise Tolerance:     Hematologic:       Musculoskeletal:       GI/Hepatic:       Renal/Genitourinary:       Endo:       Psychiatric/Substance Use:     (+) psychiatric history anxiety and depression       Infectious Disease:       Malignancy:       Other:            Physical Exam    Airway        Mallampati: II   TM distance: > 3 FB   Neck ROM: full   Mouth opening: > 3 cm    Respiratory Devices and Support  Comment: Not on oxygen currently       Dental  no notable dental history         Cardiovascular   cardiovascular exam normal          Pulmonary   pulmonary exam normal                OUTSIDE LABS:  CBC:   Lab Results   Component Value Date    WBC 6.0 10/03/2023    HGB 14.8 10/03/2023    HCT 44.4 10/03/2023     10/03/2023     BMP:   Lab Results   Component Value Date     2024     01/15/2024    POTASSIUM 4.5 2024    POTASSIUM 4.2 01/15/2024    CHLORIDE 99 2024    CHLORIDE 103 01/15/2024    CO2 29 2024    CO2 24 01/15/2024    BUN 9.3 2024    BUN 6.5 01/15/2024    CR 0.82 2024 " "   CR 0.77 01/15/2024    GLC 93 03/29/2024    GLC 91 01/15/2024     COAGS: No results found for: \"PTT\", \"INR\", \"FIBR\"  POC: No results found for: \"BGM\", \"HCG\", \"HCGS\"  HEPATIC:   Lab Results   Component Value Date    ALBUMIN 2.9 (L) 06/23/2015    PROTTOTAL 6.6 (L) 06/23/2015    ALT 18 06/23/2015    AST 25 06/23/2015    ALKPHOS 55 06/23/2015    BILITOTAL 1.1 06/23/2015     OTHER:   Lab Results   Component Value Date    YUSEF 9.4 03/29/2024    TSH 1.02 10/03/2023       Anesthesia Plan    ASA Status:  2       Anesthesia Type: General.   Induction: Intravenous, Propofol.   Maintenance: TIVA.        Consents    Anesthesia Plan(s) and associated risks, benefits, and realistic alternatives discussed. Questions answered and patient/representative(s) expressed understanding.     - Discussed: Risks, Benefits and Alternatives for BOTH SEDATION and the PROCEDURE were discussed     - Discussed with:  Patient            Postoperative Care    Pain management: IV analgesics, Oral pain medications, Multi-modal analgesia.   PONV prophylaxis: Ondansetron (or other 5HT-3), Dexamethasone or Solumedrol     Comments:    Other Comments: Patient aware of plan, what to expect, and potential risks. Timeout was performed before bringing the patient back to OR, and once again, patient was asked if they had any questions           GILBERT Hernández CRNA    I have reviewed the pertinent notes and labs in the chart from the past 30 days and (re)examined the patient.  Any updates or changes from those notes are reflected in this note.                  "

## 2024-05-06 ENCOUNTER — ANESTHESIA (OUTPATIENT)
Dept: GASTROENTEROLOGY | Facility: CLINIC | Age: 49
End: 2024-05-06
Payer: COMMERCIAL

## 2024-05-06 ENCOUNTER — HOSPITAL ENCOUNTER (OUTPATIENT)
Facility: CLINIC | Age: 49
Discharge: HOME | End: 2024-05-06
Attending: FAMILY MEDICINE | Admitting: FAMILY MEDICINE
Payer: COMMERCIAL

## 2024-05-06 VITALS
RESPIRATION RATE: 13 BRPM | SYSTOLIC BLOOD PRESSURE: 120 MMHG | HEART RATE: 78 BPM | HEIGHT: 66 IN | BODY MASS INDEX: 22.34 KG/M2 | WEIGHT: 139 LBS | OXYGEN SATURATION: 99 % | TEMPERATURE: 98.3 F | DIASTOLIC BLOOD PRESSURE: 62 MMHG

## 2024-05-06 LAB — COLONOSCOPY: NORMAL

## 2024-05-06 PROCEDURE — G0121 COLON CA SCRN NOT HI RSK IND: HCPCS | Performed by: SURGERY

## 2024-05-06 PROCEDURE — 250N000011 HC RX IP 250 OP 636

## 2024-05-06 PROCEDURE — 45378 DIAGNOSTIC COLONOSCOPY: CPT | Performed by: SURGERY

## 2024-05-06 PROCEDURE — 258N000003 HC RX IP 258 OP 636

## 2024-05-06 PROCEDURE — 250N000009 HC RX 250

## 2024-05-06 PROCEDURE — 370N000017 HC ANESTHESIA TECHNICAL FEE, PER MIN: Performed by: SURGERY

## 2024-05-06 RX ORDER — NALOXONE HYDROCHLORIDE 0.4 MG/ML
0.1 INJECTION, SOLUTION INTRAMUSCULAR; INTRAVENOUS; SUBCUTANEOUS
Status: DISCONTINUED | OUTPATIENT
Start: 2024-05-06 | End: 2024-05-06 | Stop reason: HOSPADM

## 2024-05-06 RX ORDER — DEXAMETHASONE SODIUM PHOSPHATE 4 MG/ML
4 INJECTION, SOLUTION INTRA-ARTICULAR; INTRALESIONAL; INTRAMUSCULAR; INTRAVENOUS; SOFT TISSUE
Status: DISCONTINUED | OUTPATIENT
Start: 2024-05-06 | End: 2024-05-06 | Stop reason: HOSPADM

## 2024-05-06 RX ORDER — SODIUM CHLORIDE, SODIUM LACTATE, POTASSIUM CHLORIDE, CALCIUM CHLORIDE 600; 310; 30; 20 MG/100ML; MG/100ML; MG/100ML; MG/100ML
INJECTION, SOLUTION INTRAVENOUS CONTINUOUS
Status: DISCONTINUED | OUTPATIENT
Start: 2024-05-06 | End: 2024-05-06 | Stop reason: HOSPADM

## 2024-05-06 RX ORDER — LIDOCAINE 40 MG/G
CREAM TOPICAL
Status: DISCONTINUED | OUTPATIENT
Start: 2024-05-06 | End: 2024-05-06 | Stop reason: HOSPADM

## 2024-05-06 RX ORDER — ONDANSETRON 2 MG/ML
4 INJECTION INTRAMUSCULAR; INTRAVENOUS EVERY 30 MIN PRN
Status: DISCONTINUED | OUTPATIENT
Start: 2024-05-06 | End: 2024-05-06 | Stop reason: HOSPADM

## 2024-05-06 RX ORDER — ONDANSETRON 4 MG/1
4 TABLET, ORALLY DISINTEGRATING ORAL EVERY 30 MIN PRN
Status: DISCONTINUED | OUTPATIENT
Start: 2024-05-06 | End: 2024-05-06 | Stop reason: HOSPADM

## 2024-05-06 RX ORDER — PROPOFOL 10 MG/ML
INJECTION, EMULSION INTRAVENOUS PRN
Status: DISCONTINUED | OUTPATIENT
Start: 2024-05-06 | End: 2024-05-06

## 2024-05-06 RX ADMIN — LIDOCAINE HYDROCHLORIDE 0.5 ML: 10 INJECTION, SOLUTION EPIDURAL; INFILTRATION; INTRACAUDAL; PERINEURAL at 09:53

## 2024-05-06 RX ADMIN — SODIUM CHLORIDE, POTASSIUM CHLORIDE, SODIUM LACTATE AND CALCIUM CHLORIDE: 600; 310; 30; 20 INJECTION, SOLUTION INTRAVENOUS at 09:52

## 2024-05-06 RX ADMIN — PROPOFOL 100 MG: 10 INJECTION, EMULSION INTRAVENOUS at 10:32

## 2024-05-06 RX ADMIN — PROPOFOL 150 MCG/KG/MIN: 10 INJECTION, EMULSION INTRAVENOUS at 10:34

## 2024-05-06 ASSESSMENT — ACTIVITIES OF DAILY LIVING (ADL)
ADLS_ACUITY_SCORE: 35

## 2024-05-06 NOTE — ANESTHESIA CARE TRANSFER NOTE
Patient: Joann Maurer    Procedure: Procedure(s):  Colonoscopy       Diagnosis: Screen for colon cancer [Z12.11]  Diagnosis Additional Information: No value filed.    Anesthesia Type:   General     Note:    Oropharynx: oropharynx clear of all foreign objects  Level of Consciousness: drowsy      Independent Airway: airway patency satisfactory and stable  Dentition: dentition unchanged  Vital Signs Stable: post-procedure vital signs reviewed and stable  Report to RN Given: handoff report given  Patient transferred to: Phase II    Handoff Report: Identifed the Patient, Identified the Reponsible Provider, Reviewed the pertinent medical history, Discussed the surgical course, Reviewed Intra-OP anesthesia mangement and issues during anesthesia, Set expectations for post-procedure period and Allowed opportunity for questions and acknowledgement of understanding      Vitals:  Vitals Value Taken Time   /64 05/06/24 1059   Temp     Pulse 72 05/06/24 1059   Resp     SpO2 94 % 05/06/24 1101   Vitals shown include unfiled device data.    Electronically Signed By: GILBERT Hernández CRNA  May 6, 2024  11:02 AM

## 2024-05-06 NOTE — PROGRESS NOTES
[facility] DISCHARGE NOTE    Patient discharged to home at 11:30 AM via ambulation. Accompanied by spouse and staff. Discharge instructions reviewed with patient and spouse, opportunity offered to ask questions. Prescriptions - None ordered for discharge. All belongings sent with patient.    Azul White RN

## 2024-05-06 NOTE — ANESTHESIA POSTPROCEDURE EVALUATION
Patient: Joann Maurer    Procedure: Procedure(s):  Colonoscopy       Anesthesia Type:  General    Note:  Disposition: Outpatient   Postop Pain Control: Uneventful            Sign Out: Well controlled pain   PONV: No   Neuro/Psych: Uneventful            Sign Out: Acceptable/Baseline neuro status   Airway/Respiratory: Uneventful            Sign Out: Acceptable/Baseline resp. status   CV/Hemodynamics: Uneventful            Sign Out: Acceptable CV status; No obvious hypovolemia; No obvious fluid overload   Other NRE:    DID A NON-ROUTINE EVENT OCCUR?            Last vitals:  Vitals:    05/06/24 0840   BP: (!) 145/98   Pulse: 84   Temp: 36.7  C (98  F)   SpO2: 100%       Electronically Signed By: GILBERT Hernández CRNA  May 6, 2024  11:03 AM

## 2024-05-06 NOTE — DISCHARGE INSTRUCTIONS
"Learning About Colonoscopy  What is a colonoscopy?     A colonoscopy is a test (also called a procedure) that lets a doctor look inside your large intestine. The doctor uses a thin, lighted tube called a colonoscope. The doctor uses it to look for small growths called polyps, colon or rectal cancer (colorectal cancer), or other problems like bleeding.  During the procedure, the doctor can take samples of tissue. The samples can then be checked for cancer or other conditions. The doctor can also take out polyps.  How is a colonoscopy done?  This procedure is done in a doctor's office or a clinic or hospital. You will get medicine to help you relax and not feel pain. Some people find that they don't remember having the test because of the medicine.  The doctor gently moves the colonoscope, or scope, through the colon. The scope is also a small video camera. It lets the doctor see the colon and take pictures.  How do you prepare for the procedure?  You need to clean out your colon before the procedure so the doctor can see your colon. This depends on which \"colon prep\" your doctor recommends.  To clean out your colon, you'll do a \"colon prep\" before the test. This means you stop eating solid foods and drink only clear liquids. You can have water, tea, coffee, clear juices, clear broths, flavored ice pops, and gelatin (such as Jell-O). Do not drink anything red or purple.  The day or night before the procedure, you drink a large amount of a special liquid. This causes loose, frequent stools. You will go to the bathroom a lot. Your doctor may have you drink part of the liquid the evening before and the rest on the day of the test. It's very important to drink all of the liquid. If you have problems drinking it, call your doctor.  Arrange to have someone take you home after the test.  What can you expect after a colonoscopy?  Your doctor will tell you when you can eat and do your usual activities.  Drink a lot of fluid " "after the test to replace the fluids you may have lost during the colon prep. But don't drink alcohol.  Your doctor will talk to you about when you'll need your next colonoscopy. The results of your test and your risk for colorectal cancer will help your doctor decide how often you need to be checked.  After the test, you may be bloated or have gas pains. You may need to pass gas. If a biopsy was done or a polyp was removed, you may have streaks of blood in your stool (feces) for a few days. Check with your doctor to see when it is safe to take aspirin and nonsteroidal anti-inflammatory drugs (NSAIDs) again.  Problems such as heavy rectal bleeding may not occur until several weeks after the test. This isn't common. But it can happen after polyps are removed.  Follow-up care is a key part of your treatment and safety. Be sure to make and go to all appointments, and call your doctor if you are having problems. It's also a good idea to know your test results and keep a list of the medicines you take.  Where can you learn more?  Go to https://www.[employer].net/patiented  Enter Z368 in the search box to learn more about \"Learning About Colonoscopy.\"  Current as of: October 25, 2023               Content Version: 14.0    8498-2933 [employer].   Care instructions adapted under license by your healthcare professional. If you have questions about a medical condition or this instruction, always ask your healthcare professional. [employer] disclaims any warranty or liability for your use of this information.      "

## 2024-06-21 DIAGNOSIS — F90.2 ADHD (ATTENTION DEFICIT HYPERACTIVITY DISORDER), COMBINED TYPE: ICD-10-CM

## 2024-06-21 RX ORDER — DEXTROAMPHETAMINE SACCHARATE, AMPHETAMINE ASPARTATE MONOHYDRATE, DEXTROAMPHETAMINE SULFATE AND AMPHETAMINE SULFATE 5; 5; 5; 5 MG/1; MG/1; MG/1; MG/1
20 CAPSULE, EXTENDED RELEASE ORAL DAILY
Qty: 30 CAPSULE | Refills: 0 | OUTPATIENT
Start: 2024-06-21

## 2024-06-21 NOTE — TELEPHONE ENCOUNTER
Pending Prescriptions:                       Disp   Refills    amphetamine-dextroamphetamine (ADDERALL XR*30 cap*0        Sig: Take 1 capsule (20 mg) by mouth daily    Routing refill request to provider for review/approval because:  Drug not on the [facility] refill protocol     Mimi Valverde RN  [facility]

## 2024-06-28 ENCOUNTER — MYC REFILL (OUTPATIENT)
Dept: FAMILY MEDICINE | Facility: CLINIC | Age: 49
End: 2024-06-28
Payer: COMMERCIAL

## 2024-06-28 DIAGNOSIS — F90.2 ADHD (ATTENTION DEFICIT HYPERACTIVITY DISORDER), COMBINED TYPE: ICD-10-CM

## 2024-07-01 DIAGNOSIS — F90.2 ADHD (ATTENTION DEFICIT HYPERACTIVITY DISORDER), COMBINED TYPE: ICD-10-CM

## 2024-07-01 RX ORDER — DEXTROAMPHETAMINE SACCHARATE, AMPHETAMINE ASPARTATE, DEXTROAMPHETAMINE SULFATE AND AMPHETAMINE SULFATE 2.5; 2.5; 2.5; 2.5 MG/1; MG/1; MG/1; MG/1
10 TABLET ORAL DAILY
Qty: 30 TABLET | Refills: 0 | OUTPATIENT
Start: 2024-07-01

## 2024-07-01 RX ORDER — DEXTROAMPHETAMINE SACCHARATE, AMPHETAMINE ASPARTATE MONOHYDRATE, DEXTROAMPHETAMINE SULFATE AND AMPHETAMINE SULFATE 5; 5; 5; 5 MG/1; MG/1; MG/1; MG/1
20 CAPSULE, EXTENDED RELEASE ORAL DAILY
Qty: 30 CAPSULE | Refills: 0 | OUTPATIENT
Start: 2024-07-01

## 2024-07-01 RX ORDER — DEXTROAMPHETAMINE SACCHARATE, AMPHETAMINE ASPARTATE MONOHYDRATE, DEXTROAMPHETAMINE SULFATE AND AMPHETAMINE SULFATE 5; 5; 5; 5 MG/1; MG/1; MG/1; MG/1
20 CAPSULE, EXTENDED RELEASE ORAL DAILY
Qty: 30 CAPSULE | Refills: 0 | Status: CANCELLED | OUTPATIENT
Start: 2024-07-01

## 2024-07-02 ENCOUNTER — VIRTUAL VISIT (OUTPATIENT)
Dept: FAMILY MEDICINE | Facility: CLINIC | Age: 49
End: 2024-07-02
Attending: NURSE PRACTITIONER
Payer: COMMERCIAL

## 2024-07-02 DIAGNOSIS — K64.4 EXTERNAL HEMORRHOIDS: ICD-10-CM

## 2024-07-02 DIAGNOSIS — I10 HYPERTENSION, UNSPECIFIED TYPE: ICD-10-CM

## 2024-07-02 DIAGNOSIS — F90.2 ADHD (ATTENTION DEFICIT HYPERACTIVITY DISORDER), COMBINED TYPE: Primary | ICD-10-CM

## 2024-07-02 PROCEDURE — G2211 COMPLEX E/M VISIT ADD ON: HCPCS | Mod: 95 | Performed by: NURSE PRACTITIONER

## 2024-07-02 PROCEDURE — 99214 OFFICE O/P EST MOD 30 MIN: CPT | Mod: 95 | Performed by: NURSE PRACTITIONER

## 2024-07-02 RX ORDER — DEXTROAMPHETAMINE SACCHARATE, AMPHETAMINE ASPARTATE MONOHYDRATE, DEXTROAMPHETAMINE SULFATE AND AMPHETAMINE SULFATE 5; 5; 5; 5 MG/1; MG/1; MG/1; MG/1
20 CAPSULE, EXTENDED RELEASE ORAL DAILY
Qty: 30 CAPSULE | Refills: 0 | Status: SHIPPED | OUTPATIENT
Start: 2024-08-31 | End: 2024-09-30

## 2024-07-02 RX ORDER — DEXTROAMPHETAMINE SACCHARATE, AMPHETAMINE ASPARTATE, DEXTROAMPHETAMINE SULFATE AND AMPHETAMINE SULFATE 2.5; 2.5; 2.5; 2.5 MG/1; MG/1; MG/1; MG/1
10 TABLET ORAL DAILY
Qty: 30 TABLET | Refills: 0 | Status: CANCELLED | OUTPATIENT
Start: 2024-07-02

## 2024-07-02 RX ORDER — HYDROCORTISONE 25 MG/G
CREAM TOPICAL 2 TIMES DAILY PRN
Qty: 30 G | Refills: 0 | Status: SHIPPED | OUTPATIENT
Start: 2024-07-02

## 2024-07-02 RX ORDER — DEXTROAMPHETAMINE SACCHARATE, AMPHETAMINE ASPARTATE, DEXTROAMPHETAMINE SULFATE AND AMPHETAMINE SULFATE 2.5; 2.5; 2.5; 2.5 MG/1; MG/1; MG/1; MG/1
10 TABLET ORAL DAILY
Qty: 30 TABLET | Refills: 0 | Status: SHIPPED | OUTPATIENT
Start: 2024-08-31 | End: 2024-09-30

## 2024-07-02 RX ORDER — DEXTROAMPHETAMINE SACCHARATE, AMPHETAMINE ASPARTATE MONOHYDRATE, DEXTROAMPHETAMINE SULFATE AND AMPHETAMINE SULFATE 5; 5; 5; 5 MG/1; MG/1; MG/1; MG/1
20 CAPSULE, EXTENDED RELEASE ORAL DAILY
Qty: 30 CAPSULE | Refills: 0 | Status: SHIPPED | OUTPATIENT
Start: 2024-08-01 | End: 2024-08-31

## 2024-07-02 RX ORDER — DEXTROAMPHETAMINE SACCHARATE, AMPHETAMINE ASPARTATE, DEXTROAMPHETAMINE SULFATE AND AMPHETAMINE SULFATE 2.5; 2.5; 2.5; 2.5 MG/1; MG/1; MG/1; MG/1
10 TABLET ORAL DAILY
Qty: 30 TABLET | Refills: 0 | Status: SHIPPED | OUTPATIENT
Start: 2024-08-01 | End: 2024-08-31

## 2024-07-02 RX ORDER — DEXTROAMPHETAMINE SACCHARATE, AMPHETAMINE ASPARTATE MONOHYDRATE, DEXTROAMPHETAMINE SULFATE AND AMPHETAMINE SULFATE 5; 5; 5; 5 MG/1; MG/1; MG/1; MG/1
20 CAPSULE, EXTENDED RELEASE ORAL DAILY
COMMUNITY
End: 2024-10-04

## 2024-07-02 RX ORDER — DEXTROAMPHETAMINE SACCHARATE, AMPHETAMINE ASPARTATE MONOHYDRATE, DEXTROAMPHETAMINE SULFATE AND AMPHETAMINE SULFATE 5; 5; 5; 5 MG/1; MG/1; MG/1; MG/1
20 CAPSULE, EXTENDED RELEASE ORAL DAILY
Qty: 30 CAPSULE | Refills: 0 | Status: CANCELLED | OUTPATIENT
Start: 2024-07-02

## 2024-07-02 RX ORDER — DEXTROAMPHETAMINE SACCHARATE, AMPHETAMINE ASPARTATE, DEXTROAMPHETAMINE SULFATE AND AMPHETAMINE SULFATE 2.5; 2.5; 2.5; 2.5 MG/1; MG/1; MG/1; MG/1
10 TABLET ORAL DAILY
Qty: 30 TABLET | Refills: 0 | Status: SHIPPED | OUTPATIENT
Start: 2024-07-02 | End: 2024-08-01

## 2024-07-02 RX ORDER — DEXTROAMPHETAMINE SACCHARATE, AMPHETAMINE ASPARTATE MONOHYDRATE, DEXTROAMPHETAMINE SULFATE AND AMPHETAMINE SULFATE 5; 5; 5; 5 MG/1; MG/1; MG/1; MG/1
20 CAPSULE, EXTENDED RELEASE ORAL DAILY
Qty: 30 CAPSULE | Refills: 0 | Status: SHIPPED | OUTPATIENT
Start: 2024-07-02 | End: 2024-08-01

## 2024-07-02 RX ORDER — DEXTROAMPHETAMINE SACCHARATE, AMPHETAMINE ASPARTATE, DEXTROAMPHETAMINE SULFATE AND AMPHETAMINE SULFATE 2.5; 2.5; 2.5; 2.5 MG/1; MG/1; MG/1; MG/1
10 TABLET ORAL 2 TIMES DAILY
COMMUNITY
End: 2024-10-04

## 2024-07-02 NOTE — PATIENT INSTRUCTIONS
I refilled your adderall for 3 months.  Make appointment for refills in person for the next visit.  Hemorrhoid cream ordered at pharmacy and handout given on hemorrhoid symptoms management.\  Start 1/2 tab of your lisinopril-hydrochlorothiazide and watch BP if going up, may need to change dose again.  If you can remember, please bring your remaining medication of the adderall in with you at your appointment.

## 2024-07-02 NOTE — PROGRESS NOTES
Joann is a 49 year old who is being evaluated via a billable video visit.    How would you like to obtain your AVS? MyChart  If the video visit is dropped, the invitation should be resent by: Text to cell phone: [phone]  Will anyone else be joining your video visit? No    Assessment & Plan     ADHD (attention deficit hyperactivity disorder), combined type  Stable.  Refilled for 3 months.  Advised in person follow-up in 3 months.  - PRIMARY CARE FOLLOW-UP SCHEDULING  - amphetamine-dextroamphetamine (ADDERALL XR) 20 MG 24 hr capsule; Take 1 capsule (20 mg) by mouth daily for 30 days  - amphetamine-dextroamphetamine (ADDERALL XR) 20 MG 24 hr capsule; Take 1 capsule (20 mg) by mouth daily for 30 days  - amphetamine-dextroamphetamine (ADDERALL XR) 20 MG 24 hr capsule; Take 1 capsule (20 mg) by mouth daily for 30 days  - amphetamine-dextroamphetamine (ADDERALL) 10 MG tablet; Take 1 tablet (10 mg) by mouth daily for 30 days  - amphetamine-dextroamphetamine (ADDERALL) 10 MG tablet; Take 1 tablet (10 mg) by mouth daily for 30 days  - amphetamine-dextroamphetamine (ADDERALL) 10 MG tablet; Take 1 tablet (10 mg) by mouth daily for 30 days    Hypertension, unspecified type  Advised to cut her tabs in half and try to see if there is improvement without low blood pressures.  If still low, I recommend follow-up in clinic for recheck.    External hemorrhoids  Ordered hydrocortisone perianal cream for treatment of hemorrhoid secondary to colonoscopy.  - hydrocortisone, Perianal, (HYDROCORTISONE) 2.5 % cream; Place rectally 2 times daily as needed for hemorrhoids    See Patient Instructions    Subjective   Joann is a 49 year old, presenting for the following health issues:  Attention Deficit Disorder and Hypertension        7/2/2024     7:54 AM   Additional Questions   Roomed by rmb   Accompanied by self         7/2/2024     7:54 AM   Patient Reported Additional Medications   Patient reports taking the following new medications  none     History of Present Illness       Reason for visit:  Med check update and refill    She eats 2-3 servings of fruits and vegetables daily.She consumes 3 sweetened beverage(s) daily.She exercises with enough effort to increase her heart rate 30 to 60 minutes per day.  She exercises with enough effort to increase her heart rate 6 days per week.   She is taking medications regularly.     Patient has had low BP occasionally with some dizziness.  When she is hyped up it can be 130's/80's then go down to 100's/60's when resting.      She is stable with her ADHD medication doses and does not even use the 10 mg in the afternoon all the time. She has about 8 tabs remaining at home of the 10 mg tabs.    Patient also has developed an external hemorrhoid with her colonoscopy prep.  She is using witch hazel which helps a little.  She is also using an over the counter cream which helps a little she thinks.  She is wondering if there is anything else she can be doing to improve symptoms.      Objective    Vitals - Patient Reported  Systolic (Patient Reported): 103  Diastolic (Patient Reported): 71  Pain Score: No Pain (0)  BP at home has been normal.      ADHD Follow-Up    Date of last ADHD office visit: 3/29/24  Status since last visit: Stable  Taking controlled (daily) medications as prescribed: Yes                       Parent/Patient Concerns with Medications: None  ADHD Medication       Amphetamines Disp Start End     amphetamine-dextroamphetamine (ADDERALL XR) 20 MG 24 hr capsule --  --    Sig - Route: Take 20 mg by mouth daily - Oral    Class: Historical     amphetamine-dextroamphetamine (ADDERALL) 10 MG tablet --  --    Sig - Route: Take 10 mg by mouth 2 times daily - Oral    Class: Historical    No prior authorization was found for this prescription.    Found prior authorization for another prescription for the same medication: Closed - Other          Sleep: no problems  Home/Family Concerns: None  Peer Concerns:  Improving    Co-Morbid Diagnosis: None    Currently in counseling: No    Medication Benefits:   Controlled symptoms: Hyperactivity - motor restlessness, Attention span, Distractability, Finishing tasks, Impulse control, Frustration tolerance, Accepting limits, and Peer relations  Uncontrolled Symptoms : None    Medication side effects:  Side effects noted: none    Vitals:  No vitals were obtained today due to virtual visit.    Physical Exam   GENERAL: alert and no distress  EYES: Eyes grossly normal to inspection.  No discharge or erythema, or obvious scleral/conjunctival abnormalities.  RESP: No audible wheeze, cough, or visible cyanosis.    SKIN: Visible skin clear. No significant rash, abnormal pigmentation or lesions.  NEURO: Cranial nerves grossly intact.  Mentation and speech appropriate for age.  PSYCH: Appropriate affect, tone, and pace of words    Video-Visit Details    Type of service:  Video Visit   Originating Location (pt. Location): Home  Distant Location (provider location):  On-site  Platform used for Video Visit: Antonia  Signed Electronically by: Paula Walden NP

## 2024-09-19 ENCOUNTER — PATIENT OUTREACH (OUTPATIENT)
Dept: CARE COORDINATION | Facility: CLINIC | Age: 49
End: 2024-09-19
Payer: COMMERCIAL

## 2024-09-22 ENCOUNTER — MYC MEDICAL ADVICE (OUTPATIENT)
Dept: FAMILY MEDICINE | Facility: CLINIC | Age: 49
End: 2024-09-22
Payer: COMMERCIAL

## 2024-10-03 ENCOUNTER — PATIENT OUTREACH (OUTPATIENT)
Dept: CARE COORDINATION | Facility: CLINIC | Age: 49
End: 2024-10-03
Payer: COMMERCIAL

## 2024-10-04 ENCOUNTER — OFFICE VISIT (OUTPATIENT)
Dept: FAMILY MEDICINE | Facility: CLINIC | Age: 49
End: 2024-10-04
Payer: COMMERCIAL

## 2024-10-04 VITALS
DIASTOLIC BLOOD PRESSURE: 82 MMHG | OXYGEN SATURATION: 99 % | TEMPERATURE: 97.9 F | BODY MASS INDEX: 22.74 KG/M2 | HEART RATE: 116 BPM | HEIGHT: 66 IN | RESPIRATION RATE: 16 BRPM | SYSTOLIC BLOOD PRESSURE: 114 MMHG | WEIGHT: 141.5 LBS

## 2024-10-04 DIAGNOSIS — I10 BENIGN ESSENTIAL HYPERTENSION: ICD-10-CM

## 2024-10-04 DIAGNOSIS — F90.2 ATTENTION DEFICIT HYPERACTIVITY DISORDER (ADHD), COMBINED TYPE: Primary | ICD-10-CM

## 2024-10-04 DIAGNOSIS — Z23 ENCOUNTER FOR VACCINATION: ICD-10-CM

## 2024-10-04 PROCEDURE — 90673 RIV3 VACCINE NO PRESERV IM: CPT | Performed by: NURSE PRACTITIONER

## 2024-10-04 PROCEDURE — 90471 IMMUNIZATION ADMIN: CPT | Performed by: NURSE PRACTITIONER

## 2024-10-04 PROCEDURE — 80306 DRUG TEST PRSMV INSTRMNT: CPT | Performed by: NURSE PRACTITIONER

## 2024-10-04 PROCEDURE — 99214 OFFICE O/P EST MOD 30 MIN: CPT | Mod: 25 | Performed by: NURSE PRACTITIONER

## 2024-10-04 RX ORDER — DEXTROAMPHETAMINE SACCHARATE, AMPHETAMINE ASPARTATE MONOHYDRATE, DEXTROAMPHETAMINE SULFATE AND AMPHETAMINE SULFATE 5; 5; 5; 5 MG/1; MG/1; MG/1; MG/1
20 CAPSULE, EXTENDED RELEASE ORAL DAILY
Qty: 30 CAPSULE | Refills: 0 | Status: SHIPPED | OUTPATIENT
Start: 2024-11-03 | End: 2024-12-03

## 2024-10-04 RX ORDER — DEXTROAMPHETAMINE SACCHARATE, AMPHETAMINE ASPARTATE, DEXTROAMPHETAMINE SULFATE AND AMPHETAMINE SULFATE 2.5; 2.5; 2.5; 2.5 MG/1; MG/1; MG/1; MG/1
10 TABLET ORAL 2 TIMES DAILY
Qty: 60 TABLET | Refills: 0 | Status: SHIPPED | OUTPATIENT
Start: 2024-11-03 | End: 2024-12-03

## 2024-10-04 RX ORDER — DEXTROAMPHETAMINE SACCHARATE, AMPHETAMINE ASPARTATE MONOHYDRATE, DEXTROAMPHETAMINE SULFATE AND AMPHETAMINE SULFATE 5; 5; 5; 5 MG/1; MG/1; MG/1; MG/1
20 CAPSULE, EXTENDED RELEASE ORAL DAILY
Qty: 30 CAPSULE | Refills: 0 | Status: SHIPPED | OUTPATIENT
Start: 2024-12-03 | End: 2025-01-02

## 2024-10-04 RX ORDER — DEXTROAMPHETAMINE SACCHARATE, AMPHETAMINE ASPARTATE, DEXTROAMPHETAMINE SULFATE AND AMPHETAMINE SULFATE 2.5; 2.5; 2.5; 2.5 MG/1; MG/1; MG/1; MG/1
10 TABLET ORAL 2 TIMES DAILY
Status: CANCELLED | OUTPATIENT
Start: 2024-10-04

## 2024-10-04 RX ORDER — DEXTROAMPHETAMINE SACCHARATE, AMPHETAMINE ASPARTATE MONOHYDRATE, DEXTROAMPHETAMINE SULFATE AND AMPHETAMINE SULFATE 5; 5; 5; 5 MG/1; MG/1; MG/1; MG/1
20 CAPSULE, EXTENDED RELEASE ORAL DAILY
Status: CANCELLED | OUTPATIENT
Start: 2024-10-04

## 2024-10-04 RX ORDER — DEXTROAMPHETAMINE SACCHARATE, AMPHETAMINE ASPARTATE, DEXTROAMPHETAMINE SULFATE AND AMPHETAMINE SULFATE 2.5; 2.5; 2.5; 2.5 MG/1; MG/1; MG/1; MG/1
10 TABLET ORAL 2 TIMES DAILY
Qty: 60 TABLET | Refills: 0 | Status: SHIPPED | OUTPATIENT
Start: 2024-10-04 | End: 2024-11-03

## 2024-10-04 RX ORDER — DEXTROAMPHETAMINE SACCHARATE, AMPHETAMINE ASPARTATE, DEXTROAMPHETAMINE SULFATE AND AMPHETAMINE SULFATE 2.5; 2.5; 2.5; 2.5 MG/1; MG/1; MG/1; MG/1
10 TABLET ORAL 2 TIMES DAILY
Qty: 60 TABLET | Refills: 0 | Status: SHIPPED | OUTPATIENT
Start: 2024-12-03 | End: 2025-01-02

## 2024-10-04 RX ORDER — DEXTROAMPHETAMINE SACCHARATE, AMPHETAMINE ASPARTATE MONOHYDRATE, DEXTROAMPHETAMINE SULFATE AND AMPHETAMINE SULFATE 5; 5; 5; 5 MG/1; MG/1; MG/1; MG/1
20 CAPSULE, EXTENDED RELEASE ORAL DAILY
Qty: 30 CAPSULE | Refills: 0 | Status: SHIPPED | OUTPATIENT
Start: 2024-10-04 | End: 2024-11-03

## 2024-10-04 ASSESSMENT — PAIN SCALES - GENERAL: PAINLEVEL: NO PAIN (0)

## 2024-10-04 NOTE — PROGRESS NOTES
Assessment & Plan     Attention deficit hyperactivity disorder (ADHD), combined type  Stable on current dose.  Refilled out a control substance agreement today.  UDS completed.  Refilled medication for 3 months.  Advised follow-up on video visit in 3 months.  - amphetamine-dextroamphetamine (ADDERALL XR) 20 MG 24 hr capsule; Take 1 capsule (20 mg) by mouth daily.  - amphetamine-dextroamphetamine (ADDERALL XR) 20 MG 24 hr capsule; Take 1 capsule (20 mg) by mouth daily.  - amphetamine-dextroamphetamine (ADDERALL XR) 20 MG 24 hr capsule; Take 1 capsule (20 mg) by mouth daily.  - amphetamine-dextroamphetamine (ADDERALL) 10 MG tablet; Take 1 tablet (10 mg) by mouth 2 times daily.  - amphetamine-dextroamphetamine (ADDERALL) 10 MG tablet; Take 1 tablet (10 mg) by mouth 2 times daily.  - amphetamine-dextroamphetamine (ADDERALL) 10 MG tablet; Take 1 tablet (10 mg) by mouth 2 times daily.  - Urine Drug Screen Clinic; Future  - PRIMARY CARE FOLLOW-UP SCHEDULING; Future    Benign essential hypertension  Stable.    Encounter for vaccination  - INFLUENZA VACCINE TRIVALENT(FLUBLOK)            See Patient Instructions    Subjective   Joann is a 49 year old, presenting for the following health issues:  Medication Request (Would like Adderall refill )        10/4/2024     2:30 PM   Additional Questions   Roomed by Karen BRAVO     History of Present Illness       Reason for visit:  Med refill   She is taking medications regularly.    Recheck ADHD/ADD. Adderall 30 MG    Updates since last visit: no   Routine for taking medicine, including time: takes 20 MG in AM when she wakes up, and then 10 MG at 12 PM   Time medicine wears off: 7 PM   Issues at school/Work: No   Issues at home: No    Control of symptoms: Well controlled     Side effects:  Headaches: No  Stomach aches: No  Irritability/mood swings: No  Difficulties with sleep: No  Social withdrawal: No  Unusual movements/tics: No  Decreased appetite: Yes more mindful of what she eats  "    Other concerns: No         Hypertension Follow-up    Do you check your blood pressure regularly outside of the clinic? No   Are you following a low salt diet? No  Are your blood pressures ever more than 140 on the top number (systolic) OR more   than 90 on the bottom number (diastolic), for example 140/90? No      Review of Systems  CONSTITUTIONAL: NEGATIVE for fever, chills, change in weight  RESP: NEGATIVE for significant cough or SOB  CV: NEGATIVE for chest pain, palpitations or peripheral edema  PSYCHIATRIC: NEGATIVE for changes in mood or affect; stable ADHD on medication  ROS otherwise negative      Objective    /82   Pulse 116   Temp 97.9  F (36.6  C) (Tympanic)   Resp 16   Ht 1.683 m (5' 6.26\")   Wt 64.2 kg (141 lb 8 oz)   LMP  (LMP Unknown)   SpO2 99%   BMI 22.66 kg/m    Body mass index is 22.66 kg/m .  Physical Exam   GENERAL: alert and no distress  RESP: lungs clear to auscultation - no rales, rhonchi or wheezes  CV: regular rate and rhythm, normal S1 S2, no S3 or S4, no murmur, click or rub, no peripheral edema  PSYCH: mentation appears normal, affect normal/bright    Signed Electronically by: Paula Walden NP    "

## 2024-10-04 NOTE — LETTER
[facility]  10/04/24  Patient: Joann Maurer  YOB: 1975  Medical Record Number: [id number]                                                                                  Non-Opioid Controlled Substance Agreement    This is an agreement between you and your provider regarding safe and appropriate use of controlled substances prescribed by your care team. Controlled substances are?medicines that can cause physical and mental dependence (abuse).     There are strict laws about having and using these medicines. We here at [facility] are  committed to working with you in your efforts to get better. To support you in this work, we'll help you schedule regular office appointments for medicine refills. If we must cancel or change your appointment for any reason, we'll make sure you have enough medicine to last until your next appointment.     As a Provider, I will:   Listen carefully to your concerns while treating you with respect.   Recommend a treatment plan that I believe is in your best interest and may involve therapies other than medicine.    Talk with you often about the possible benefits and the risk of harm of any medicine that we prescribe for you.  Assess the safety of this medicine and check how well it works.    Provide a plan on how to taper (discontinue or go off) using this medicine if the decision is made to stop its use.      ::  As a Patient, I understand controlled substances:     Are prescribed by my care provider to help me function or work and manage my condition(s).?  Are strong medicines and can cause serious side effects.     Need to be taken exactly as prescribed.?Combining controlled substances with certain medicines or chemicals (such as illegal drugs, alcohol, sedatives, sleeping pills, and benzodiazepines) can be dangerous or even fatal.? If I stop taking my medicines suddenly, I may have severe withdrawal symptoms.     The risks, benefits,  and side effects of these medicine(s) were explained to me. I agree that:    I will take part in other treatments as advised by my care team. This may be psychiatry or counseling, physical therapy, behavioral therapy, group treatment or a referral to specialist.    I will keep all my appointments and understand this is part of the monitoring of controlled substances.?My care team may require an office visit for EVERY controlled substance refill. If I miss appointments or don t follow instructions, my care team may stop my medicine    I will take my medicines as prescribed. I will not change the dose or schedule unless my care team tells me to. There will be no refills if I run out early.      I may be asked to come to the clinic and complete a urine drug test or complete a pill count. If I don t give a urine sample or participate in a pill count, the care team may stop my medicine.    I will only receive controlled substance prescriptions from this clinic. If I am treated by another provider, I will tell them that I am taking controlled substances and that I have a treatment agreement with this provider. I will inform my [facility] care team within one business day if I am given a prescription for any controlled substance by another healthcare provider. My [facility] care team can contact other providers and pharmacists about my use of any medicines.    It is up to me to make sure that I don't run out of my medicines on weekends or holidays.?If my care team is willing to refill my prescription without a visit, I must request refills only during office hours. Refills may take up to 3 business days to process. I will use one pharmacy to fill all my controlled substance prescriptions. I will notify the clinic about any changes to my insurance or medicine availability.    I am responsible for my prescriptions. If the medicine/prescription is lost, stolen or destroyed, it will not be replaced.?I also agree  not to share controlled substance medicines with anyone.     I am aware I should not use any illegal or recreational drugs. I agree not to drink alcohol unless my care team says I can.     If I enroll in the [place] Medical Cannabis program, I will tell my care team before my next refill.    I will tell my care team right away if I become pregnant, have a new medical problem treated outside of my regular clinic, or have a change in my medicines.     I understand that this medicine can affect my thinking, judgment and reaction time.? Alcohol and drugs affect the brain and body, which can affect the safety of my driving. Being under the influence of alcohol or drugs can affect my decision-making, behaviors, personal safety and the safety of others. Driving while impaired (DWI) can occur if a person is driving, operating or in physical control of a car, motorcycle, boat, snowmobile, ATV, motorbike, off-road vehicle or any other motor vehicle ([place] Statute 169A.20). I understand the risk if I choose to drive or operate any vehicle or machinery.    I understand that if I do not follow any of the conditions above, my prescriptions or treatment may be stopped or changed.   I agree that my provider, clinic care team and pharmacy may work with any city, state or federal law enforcement agency that investigates the misuse, sale or other diversion of my controlled medicine. I will allow my provider to discuss my care with, or share a copy of, this agreement with any other treating provider, pharmacy or emergency room where I receive care.     I have read this agreement and have asked questions about anything I did not understand.    ________________________________________________________  Patient Signature - Joann Maurer     ___________________                   Date     ________________________________________________________  Provider Signature - Paula Walden, NP       ___________________                   Date      ________________________________________________________  Witness Signature (required if provider not present while patient signing)          ___________________                   Date

## 2024-10-04 NOTE — PATIENT INSTRUCTIONS
BP normal.  Will continue current dosing.  Refilled adderall for 3 months.  Influenza given today.

## 2024-10-07 LAB
AMPHETAMINES UR QL: DETECTED
BARBITURATES UR QL SCN: NOT DETECTED
BENZODIAZ UR QL SCN: NOT DETECTED
BUPRENORPHINE UR QL: NOT DETECTED
CANNABINOIDS UR QL: NOT DETECTED
COCAINE UR QL SCN: NOT DETECTED
D-METHAMPHET UR QL: NOT DETECTED
METHADONE UR QL SCN: NOT DETECTED
OPIATES UR QL SCN: NOT DETECTED
OXYCODONE UR QL SCN: NOT DETECTED
PCP UR QL SCN: NOT DETECTED
TRICYCLICS UR QL SCN: NOT DETECTED

## 2024-10-12 ENCOUNTER — MYC MEDICAL ADVICE (OUTPATIENT)
Dept: FAMILY MEDICINE | Facility: CLINIC | Age: 49
End: 2024-10-12
Payer: COMMERCIAL

## 2024-10-12 NOTE — LETTER
October 14, 2024      Joann Maurer  [id number] [facility] [place] [address]        To Whom It May Concern:    Joann Maurer  was seen in our clinic.  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        Sincerely,        Paula Walden, NP

## 2024-10-14 NOTE — TELEPHONE ENCOUNTER
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     See emery Tan, RN

## 2024-10-16 ENCOUNTER — MYC MEDICAL ADVICE (OUTPATIENT)
Dept: FAMILY MEDICINE | Facility: CLINIC | Age: 49
End: 2024-10-16
Payer: COMMERCIAL

## 2024-10-18 NOTE — TELEPHONE ENCOUNTER
Waiting for patient to respond with her email address. Form placed in pending bin.    Renita Palmer on 10/18/2024 at 1:53 PM

## 2024-10-21 NOTE — TELEPHONE ENCOUNTER
Form emailed to [email or web address] per patient request. Form placed in file cabinet.    Renita Palmer on 10/21/2024 at 9:34 AM

## 2024-12-22 ENCOUNTER — HEALTH MAINTENANCE LETTER (OUTPATIENT)
Age: 49
End: 2024-12-22

## 2025-01-06 ENCOUNTER — VIRTUAL VISIT (OUTPATIENT)
Dept: FAMILY MEDICINE | Facility: CLINIC | Age: 50
End: 2025-01-06
Attending: NURSE PRACTITIONER
Payer: COMMERCIAL

## 2025-01-06 DIAGNOSIS — F90.2 ADHD (ATTENTION DEFICIT HYPERACTIVITY DISORDER), COMBINED TYPE: ICD-10-CM

## 2025-01-06 DIAGNOSIS — I10 BENIGN ESSENTIAL HYPERTENSION: ICD-10-CM

## 2025-01-06 PROCEDURE — 98006 SYNCH AUDIO-VIDEO EST MOD 30: CPT | Performed by: NURSE PRACTITIONER

## 2025-01-06 RX ORDER — DEXTROAMPHETAMINE SACCHARATE, AMPHETAMINE ASPARTATE, DEXTROAMPHETAMINE SULFATE AND AMPHETAMINE SULFATE 2.5; 2.5; 2.5; 2.5 MG/1; MG/1; MG/1; MG/1
10 TABLET ORAL 2 TIMES DAILY
Qty: 60 TABLET | Refills: 0 | Status: SHIPPED | OUTPATIENT
Start: 2025-01-06 | End: 2025-02-05

## 2025-01-06 RX ORDER — DEXTROAMPHETAMINE SACCHARATE, AMPHETAMINE ASPARTATE, DEXTROAMPHETAMINE SULFATE AND AMPHETAMINE SULFATE 2.5; 2.5; 2.5; 2.5 MG/1; MG/1; MG/1; MG/1
10 TABLET ORAL DAILY
Qty: 30 TABLET | Refills: 0 | Status: CANCELLED | OUTPATIENT
Start: 2025-01-06

## 2025-01-06 RX ORDER — DEXTROAMPHETAMINE SACCHARATE, AMPHETAMINE ASPARTATE MONOHYDRATE, DEXTROAMPHETAMINE SULFATE AND AMPHETAMINE SULFATE 5; 5; 5; 5 MG/1; MG/1; MG/1; MG/1
20 CAPSULE, EXTENDED RELEASE ORAL DAILY
Qty: 30 CAPSULE | Refills: 0 | Status: SHIPPED | OUTPATIENT
Start: 2025-03-07 | End: 2025-04-06

## 2025-01-06 RX ORDER — DEXTROAMPHETAMINE SACCHARATE, AMPHETAMINE ASPARTATE MONOHYDRATE, DEXTROAMPHETAMINE SULFATE AND AMPHETAMINE SULFATE 5; 5; 5; 5 MG/1; MG/1; MG/1; MG/1
20 CAPSULE, EXTENDED RELEASE ORAL DAILY
Qty: 30 CAPSULE | Refills: 0 | Status: CANCELLED | OUTPATIENT
Start: 2025-01-06

## 2025-01-06 RX ORDER — DEXTROAMPHETAMINE SACCHARATE, AMPHETAMINE ASPARTATE MONOHYDRATE, DEXTROAMPHETAMINE SULFATE AND AMPHETAMINE SULFATE 5; 5; 5; 5 MG/1; MG/1; MG/1; MG/1
20 CAPSULE, EXTENDED RELEASE ORAL DAILY
Qty: 30 CAPSULE | Refills: 0 | Status: SHIPPED | OUTPATIENT
Start: 2025-01-06 | End: 2025-02-05

## 2025-01-06 RX ORDER — DEXTROAMPHETAMINE SACCHARATE, AMPHETAMINE ASPARTATE, DEXTROAMPHETAMINE SULFATE AND AMPHETAMINE SULFATE 2.5; 2.5; 2.5; 2.5 MG/1; MG/1; MG/1; MG/1
10 TABLET ORAL 2 TIMES DAILY
Qty: 60 TABLET | Refills: 0 | Status: SHIPPED | OUTPATIENT
Start: 2025-03-07 | End: 2025-04-06

## 2025-01-06 RX ORDER — DEXTROAMPHETAMINE SACCHARATE, AMPHETAMINE ASPARTATE, DEXTROAMPHETAMINE SULFATE AND AMPHETAMINE SULFATE 2.5; 2.5; 2.5; 2.5 MG/1; MG/1; MG/1; MG/1
10 TABLET ORAL 2 TIMES DAILY
Qty: 60 TABLET | Refills: 0 | Status: SHIPPED | OUTPATIENT
Start: 2025-02-05 | End: 2025-03-07

## 2025-01-06 RX ORDER — LISINOPRIL AND HYDROCHLOROTHIAZIDE 20; 25 MG/1; MG/1
1 TABLET ORAL DAILY
Qty: 90 TABLET | Refills: 3 | Status: SHIPPED | OUTPATIENT
Start: 2025-01-06

## 2025-01-06 RX ORDER — DEXTROAMPHETAMINE SACCHARATE, AMPHETAMINE ASPARTATE MONOHYDRATE, DEXTROAMPHETAMINE SULFATE AND AMPHETAMINE SULFATE 5; 5; 5; 5 MG/1; MG/1; MG/1; MG/1
20 CAPSULE, EXTENDED RELEASE ORAL DAILY
Qty: 30 CAPSULE | Refills: 0 | Status: SHIPPED | OUTPATIENT
Start: 2025-02-05 | End: 2025-03-07

## 2025-01-06 NOTE — PATIENT INSTRUCTIONS
Make appointment for lab to check electrolytes and kidney function.  I refilled your blood pressure medication for 1 year.  I refilled your Adderall for 3 months.  Follow-up early April for in clinic visit for recheck on ADHD and refills.

## 2025-02-25 ENCOUNTER — MYC MEDICAL ADVICE (OUTPATIENT)
Dept: FAMILY MEDICINE | Facility: CLINIC | Age: 50
End: 2025-02-25
Payer: COMMERCIAL

## 2025-03-17 ENCOUNTER — HOSPITAL ENCOUNTER (OUTPATIENT)
Dept: MAMMOGRAPHY | Facility: CLINIC | Age: 50
Discharge: HOME | End: 2025-03-17
Attending: NURSE PRACTITIONER | Admitting: NURSE PRACTITIONER
Payer: COMMERCIAL

## 2025-03-17 ENCOUNTER — LAB (OUTPATIENT)
Dept: LAB | Facility: CLINIC | Age: 50
End: 2025-03-17
Payer: COMMERCIAL

## 2025-03-17 DIAGNOSIS — Z12.31 VISIT FOR SCREENING MAMMOGRAM: ICD-10-CM

## 2025-03-17 DIAGNOSIS — I10 BENIGN ESSENTIAL HYPERTENSION: ICD-10-CM

## 2025-03-17 LAB
ANION GAP SERPL CALCULATED.3IONS-SCNC: 10 MMOL/L (ref 7–15)
BUN SERPL-MCNC: 15.6 MG/DL (ref 6–20)
CALCIUM SERPL-MCNC: 8.9 MG/DL (ref 8.8–10.4)
CHLORIDE SERPL-SCNC: 100 MMOL/L (ref 98–107)
CREAT SERPL-MCNC: 0.81 MG/DL (ref 0.51–0.95)
EGFRCR SERPLBLD CKD-EPI 2021: 87 ML/MIN/1.73M2
GLUCOSE SERPL-MCNC: 120 MG/DL (ref 70–99)
HCO3 SERPL-SCNC: 28 MMOL/L (ref 22–29)
POTASSIUM SERPL-SCNC: 3.6 MMOL/L (ref 3.4–5.3)
SODIUM SERPL-SCNC: 138 MMOL/L (ref 135–145)

## 2025-03-17 PROCEDURE — 80048 BASIC METABOLIC PNL TOTAL CA: CPT

## 2025-03-17 PROCEDURE — 77063 BREAST TOMOSYNTHESIS BI: CPT

## 2025-03-17 PROCEDURE — 36415 COLL VENOUS BLD VENIPUNCTURE: CPT

## 2025-04-04 ENCOUNTER — OFFICE VISIT (OUTPATIENT)
Dept: FAMILY MEDICINE | Facility: CLINIC | Age: 50
End: 2025-04-04
Payer: COMMERCIAL

## 2025-04-04 VITALS
OXYGEN SATURATION: 97 % | HEIGHT: 65 IN | HEART RATE: 82 BPM | BODY MASS INDEX: 24.04 KG/M2 | RESPIRATION RATE: 18 BRPM | SYSTOLIC BLOOD PRESSURE: 132 MMHG | DIASTOLIC BLOOD PRESSURE: 82 MMHG | TEMPERATURE: 97.9 F | WEIGHT: 144.3 LBS

## 2025-04-04 DIAGNOSIS — F90.2 ADHD (ATTENTION DEFICIT HYPERACTIVITY DISORDER), COMBINED TYPE: ICD-10-CM

## 2025-04-04 PROCEDURE — 3079F DIAST BP 80-89 MM HG: CPT | Performed by: NURSE PRACTITIONER

## 2025-04-04 PROCEDURE — 1126F AMNT PAIN NOTED NONE PRSNT: CPT | Performed by: NURSE PRACTITIONER

## 2025-04-04 PROCEDURE — 99213 OFFICE O/P EST LOW 20 MIN: CPT | Performed by: NURSE PRACTITIONER

## 2025-04-04 PROCEDURE — 3075F SYST BP GE 130 - 139MM HG: CPT | Performed by: NURSE PRACTITIONER

## 2025-04-04 RX ORDER — DEXTROAMPHETAMINE SACCHARATE, AMPHETAMINE ASPARTATE MONOHYDRATE, DEXTROAMPHETAMINE SULFATE AND AMPHETAMINE SULFATE 5; 5; 5; 5 MG/1; MG/1; MG/1; MG/1
20 CAPSULE, EXTENDED RELEASE ORAL DAILY
Qty: 30 CAPSULE | Refills: 0 | Status: CANCELLED | OUTPATIENT
Start: 2025-04-04

## 2025-04-04 RX ORDER — DEXTROAMPHETAMINE SACCHARATE, AMPHETAMINE ASPARTATE MONOHYDRATE, DEXTROAMPHETAMINE SULFATE AND AMPHETAMINE SULFATE 5; 5; 5; 5 MG/1; MG/1; MG/1; MG/1
20 CAPSULE, EXTENDED RELEASE ORAL DAILY
Qty: 30 CAPSULE | Refills: 0 | Status: SHIPPED | OUTPATIENT
Start: 2025-05-14 | End: 2025-06-13

## 2025-04-04 RX ORDER — DEXTROAMPHETAMINE SACCHARATE, AMPHETAMINE ASPARTATE, DEXTROAMPHETAMINE SULFATE AND AMPHETAMINE SULFATE 2.5; 2.5; 2.5; 2.5 MG/1; MG/1; MG/1; MG/1
10 TABLET ORAL 2 TIMES DAILY
Qty: 60 TABLET | Refills: 0 | Status: SHIPPED | OUTPATIENT
Start: 2025-06-13 | End: 2025-07-13

## 2025-04-04 RX ORDER — DEXTROAMPHETAMINE SACCHARATE, AMPHETAMINE ASPARTATE MONOHYDRATE, DEXTROAMPHETAMINE SULFATE AND AMPHETAMINE SULFATE 5; 5; 5; 5 MG/1; MG/1; MG/1; MG/1
20 CAPSULE, EXTENDED RELEASE ORAL DAILY
Qty: 30 CAPSULE | Refills: 0 | Status: SHIPPED | OUTPATIENT
Start: 2025-04-14 | End: 2025-05-14

## 2025-04-04 RX ORDER — DEXTROAMPHETAMINE SACCHARATE, AMPHETAMINE ASPARTATE MONOHYDRATE, DEXTROAMPHETAMINE SULFATE AND AMPHETAMINE SULFATE 5; 5; 5; 5 MG/1; MG/1; MG/1; MG/1
20 CAPSULE, EXTENDED RELEASE ORAL DAILY
Qty: 30 CAPSULE | Refills: 0 | Status: SHIPPED | OUTPATIENT
Start: 2025-06-13 | End: 2025-07-13

## 2025-04-04 RX ORDER — DEXTROAMPHETAMINE SACCHARATE, AMPHETAMINE ASPARTATE, DEXTROAMPHETAMINE SULFATE AND AMPHETAMINE SULFATE 2.5; 2.5; 2.5; 2.5 MG/1; MG/1; MG/1; MG/1
10 TABLET ORAL 2 TIMES DAILY
Qty: 60 TABLET | Refills: 0 | Status: SHIPPED | OUTPATIENT
Start: 2025-05-14 | End: 2025-06-13

## 2025-04-04 RX ORDER — DEXTROAMPHETAMINE SACCHARATE, AMPHETAMINE ASPARTATE, DEXTROAMPHETAMINE SULFATE AND AMPHETAMINE SULFATE 2.5; 2.5; 2.5; 2.5 MG/1; MG/1; MG/1; MG/1
10 TABLET ORAL 2 TIMES DAILY
Qty: 60 TABLET | Refills: 0 | Status: SHIPPED | OUTPATIENT
Start: 2025-04-14 | End: 2025-05-14

## 2025-04-04 RX ORDER — DEXTROAMPHETAMINE SACCHARATE, AMPHETAMINE ASPARTATE, DEXTROAMPHETAMINE SULFATE AND AMPHETAMINE SULFATE 2.5; 2.5; 2.5; 2.5 MG/1; MG/1; MG/1; MG/1
10 TABLET ORAL 2 TIMES DAILY
Qty: 60 TABLET | Refills: 0 | Status: CANCELLED | OUTPATIENT
Start: 2025-04-04

## 2025-04-04 ASSESSMENT — ANXIETY QUESTIONNAIRES
7. FEELING AFRAID AS IF SOMETHING AWFUL MIGHT HAPPEN: NOT AT ALL
1. FEELING NERVOUS, ANXIOUS, OR ON EDGE: NOT AT ALL
4. TROUBLE RELAXING: NOT AT ALL
2. NOT BEING ABLE TO STOP OR CONTROL WORRYING: NOT AT ALL
6. BECOMING EASILY ANNOYED OR IRRITABLE: NOT AT ALL
5. BEING SO RESTLESS THAT IT IS HARD TO SIT STILL: NOT AT ALL
3. WORRYING TOO MUCH ABOUT DIFFERENT THINGS: NOT AT ALL
GAD7 TOTAL SCORE: 0
8. IF YOU CHECKED OFF ANY PROBLEMS, HOW DIFFICULT HAVE THESE MADE IT FOR YOU TO DO YOUR WORK, TAKE CARE OF THINGS AT HOME, OR GET ALONG WITH OTHER PEOPLE?: NOT DIFFICULT AT ALL
7. FEELING AFRAID AS IF SOMETHING AWFUL MIGHT HAPPEN: NOT AT ALL
GAD7 TOTAL SCORE: 0
GAD7 TOTAL SCORE: 0
IF YOU CHECKED OFF ANY PROBLEMS ON THIS QUESTIONNAIRE, HOW DIFFICULT HAVE THESE PROBLEMS MADE IT FOR YOU TO DO YOUR WORK, TAKE CARE OF THINGS AT HOME, OR GET ALONG WITH OTHER PEOPLE: NOT DIFFICULT AT ALL

## 2025-04-04 ASSESSMENT — PATIENT HEALTH QUESTIONNAIRE - PHQ9
SUM OF ALL RESPONSES TO PHQ QUESTIONS 1-9: 0
SUM OF ALL RESPONSES TO PHQ QUESTIONS 1-9: 0
10. IF YOU CHECKED OFF ANY PROBLEMS, HOW DIFFICULT HAVE THESE PROBLEMS MADE IT FOR YOU TO DO YOUR WORK, TAKE CARE OF THINGS AT HOME, OR GET ALONG WITH OTHER PEOPLE: NOT DIFFICULT AT ALL

## 2025-04-04 ASSESSMENT — PAIN SCALES - GENERAL: PAINLEVEL_OUTOF10: NO PAIN (0)

## 2025-04-04 NOTE — PROGRESS NOTES
"  Assessment & Plan     ADHD (attention deficit hyperactivity disorder), combined type  Patient continues to do well on current dose.  Refilled for 3 months.  - amphetamine-dextroamphetamine (ADDERALL XR) 20 MG 24 hr capsule; Take 1 capsule (20 mg) by mouth daily.  - amphetamine-dextroamphetamine (ADDERALL XR) 20 MG 24 hr capsule; Take 1 capsule (20 mg) by mouth daily.  - amphetamine-dextroamphetamine (ADDERALL XR) 20 MG 24 hr capsule; Take 1 capsule (20 mg) by mouth daily.  - amphetamine-dextroamphetamine (ADDERALL) 10 MG tablet; Take 1 tablet (10 mg) by mouth 2 times daily.  - amphetamine-dextroamphetamine (ADDERALL) 10 MG tablet; Take 1 tablet (10 mg) by mouth 2 times daily.  - amphetamine-dextroamphetamine (ADDERALL) 10 MG tablet; Take 1 tablet (10 mg) by mouth 2 times daily.    See Patient Instructions    Subjective   Joann is a 50 year old, presenting for the following health issues:  A.D.H.D        4/4/2025     3:26 PM   Additional Questions   Roomed by Sandi Cordova CMA   Accompanied by self     HPI      Medication Followup of  Adderall XR 20 MG   Taking Medication as prescribed: yes  Side Effects:  None  Medication Helping Symptoms:  yes    Patient states that she is doing well on current treatment.      Review of Systems  CONSTITUTIONAL: NEGATIVE for fever, chills, change in weight  RESP: NEGATIVE for significant cough or SOB  CV: NEGATIVE for chest pain, palpitations or peripheral edema  PSYCHIATRIC: POSITIVE for ADHD controlled on medication  ROS otherwise negative      Objective    /82 (BP Location: Right arm, Patient Position: Sitting, Cuff Size: Adult Regular)   Pulse 82   Temp 97.9  F (36.6  C) (Tympanic)   Resp 18   Ht 1.655 m (5' 5.16\")   Wt 65.5 kg (144 lb 4.8 oz)   LMP  (LMP Unknown)   SpO2 97%   Breastfeeding No   BMI 23.90 kg/m    Body mass index is 23.9 kg/m .  Physical Exam   GENERAL: alert and no distress  RESP: lungs clear to auscultation - no rales, rhonchi or wheezes  CV: " regular rate and rhythm, normal S1 S2, no S3 or S4, no murmur, click or rub, no peripheral edema  MS: no gross musculoskeletal defects noted, no edema  PSYCH: mentation appears normal, affect normal/bright        Signed Electronically by: Paula Walden NP    Answers submitted by the patient for this visit:  Patient Health Questionnaire (Submitted on 4/4/2025)  If you checked off any problems, how difficult have these problems made it for you to do your work, take care of things at home, or get along with other people?: Not difficult at all  PHQ9 TOTAL SCORE: 0  Patient Health Questionnaire (G7) (Submitted on 4/4/2025)  JONNY 7 TOTAL SCORE: 0

## 2025-04-15 ENCOUNTER — MYC MEDICAL ADVICE (OUTPATIENT)
Dept: FAMILY MEDICINE | Facility: CLINIC | Age: 50
End: 2025-04-15
Payer: COMMERCIAL

## 2025-04-16 NOTE — TELEPHONE ENCOUNTER
Attachments have been printed and routed to provider for review. The copy from Oct 2024 was no longer in the filing cabinet and the completed forms were emailed to a source that was unable to open it so it must be redone    Dee Vzaquez on 4/16/2025 at 7:27 AM

## 2025-04-17 ENCOUNTER — PATIENT OUTREACH (OUTPATIENT)
Dept: CARE COORDINATION | Facility: CLINIC | Age: 50
End: 2025-04-17
Payer: COMMERCIAL

## 2025-04-17 NOTE — TELEPHONE ENCOUNTER
Per Paula, needing to clarify with patient regarding form.  Is the letter in chart dated 10/14 sufficient enough to fax to employer?  Do the questions on form need to be answered in addition to this?  Or in letter form?    LM for patient to call back to clarify.  Form is in [facility] pending basket.    Abena Hsu on 4/17/2025 at 2:11 PM

## 2025-04-30 ENCOUNTER — MYC MEDICAL ADVICE (OUTPATIENT)
Dept: FAMILY MEDICINE | Facility: CLINIC | Age: 50
End: 2025-04-30
Payer: COMMERCIAL

## 2025-05-01 NOTE — TELEPHONE ENCOUNTER
Letter done by Paula Walden.  Will place form and letter up front for pt to .  Will place copy in cabinet drawer.    Abena Hsu on 5/1/2025 at 2:05 PM

## 2025-05-01 NOTE — TELEPHONE ENCOUNTER
Pt returned call and states the 10/14 letter did not suffice and pt was requested to have PCP fill out the few questions on the form provided. Pt will  in clinic when complete. Paperwork placed in provider box.   Dee Vazquez on 5/1/2025 at 12:50 PM

## 2025-05-01 NOTE — TELEPHONE ENCOUNTER
See note below.  Needing this info before Paula can complete form.  Left message again today asking pt to call us back.      Abena Hsu on 5/1/2025 at 9:12 AM      Note  Per Paula, needing to clarify with patient regarding form.  Is the letter in chart dated 10/14 sufficient enough to fax to employer?  Do the questions on form need to be answered in addition to this?  Or in letter form?     LM for patient to call back to clarify.  Form is in [employer] pending basket.     Abena Hsu on 4/17/2025 at 2:11 PM

## 2025-07-07 DIAGNOSIS — F90.2 ADHD (ATTENTION DEFICIT HYPERACTIVITY DISORDER), COMBINED TYPE: ICD-10-CM

## 2025-07-07 RX ORDER — DEXTROAMPHETAMINE SACCHARATE, AMPHETAMINE ASPARTATE MONOHYDRATE, DEXTROAMPHETAMINE SULFATE AND AMPHETAMINE SULFATE 5; 5; 5; 5 MG/1; MG/1; MG/1; MG/1
20 CAPSULE, EXTENDED RELEASE ORAL DAILY
Qty: 30 CAPSULE | Refills: 0 | Status: SHIPPED | OUTPATIENT
Start: 2025-07-07 | End: 2025-08-06

## 2025-08-09 ENCOUNTER — MYC REFILL (OUTPATIENT)
Dept: FAMILY MEDICINE | Facility: CLINIC | Age: 50
End: 2025-08-09
Payer: COMMERCIAL

## 2025-08-09 DIAGNOSIS — F90.2 ADHD (ATTENTION DEFICIT HYPERACTIVITY DISORDER), COMBINED TYPE: ICD-10-CM

## 2025-08-12 ENCOUNTER — MYC MEDICAL ADVICE (OUTPATIENT)
Dept: FAMILY MEDICINE | Facility: CLINIC | Age: 50
End: 2025-08-12
Payer: COMMERCIAL

## 2025-08-12 DIAGNOSIS — F90.2 ADHD (ATTENTION DEFICIT HYPERACTIVITY DISORDER), COMBINED TYPE: ICD-10-CM

## 2025-08-12 RX ORDER — DEXTROAMPHETAMINE SACCHARATE, AMPHETAMINE ASPARTATE MONOHYDRATE, DEXTROAMPHETAMINE SULFATE AND AMPHETAMINE SULFATE 5; 5; 5; 5 MG/1; MG/1; MG/1; MG/1
20 CAPSULE, EXTENDED RELEASE ORAL DAILY
Qty: 30 CAPSULE | Refills: 0 | Status: SHIPPED | OUTPATIENT
Start: 2025-08-12 | End: 2025-09-11

## 2025-08-12 RX ORDER — DEXTROAMPHETAMINE SACCHARATE, AMPHETAMINE ASPARTATE MONOHYDRATE, DEXTROAMPHETAMINE SULFATE AND AMPHETAMINE SULFATE 5; 5; 5; 5 MG/1; MG/1; MG/1; MG/1
20 CAPSULE, EXTENDED RELEASE ORAL DAILY
Qty: 30 CAPSULE | Refills: 0 | OUTPATIENT
Start: 2025-08-12

## 2025-08-13 RX ORDER — DEXTROAMPHETAMINE SACCHARATE, AMPHETAMINE ASPARTATE, DEXTROAMPHETAMINE SULFATE AND AMPHETAMINE SULFATE 2.5; 2.5; 2.5; 2.5 MG/1; MG/1; MG/1; MG/1
10 TABLET ORAL 2 TIMES DAILY
Qty: 60 TABLET | Refills: 0 | Status: SHIPPED | OUTPATIENT
Start: 2025-08-13

## 2025-08-13 RX ORDER — DEXTROAMPHETAMINE SACCHARATE, AMPHETAMINE ASPARTATE, DEXTROAMPHETAMINE SULFATE AND AMPHETAMINE SULFATE 2.5; 2.5; 2.5; 2.5 MG/1; MG/1; MG/1; MG/1
10 TABLET ORAL 2 TIMES DAILY
Qty: 60 TABLET | Refills: 0 | Status: SHIPPED | OUTPATIENT
Start: 2025-08-13 | End: 2025-09-12